# Patient Record
Sex: MALE | Race: WHITE | Employment: OTHER | URBAN - METROPOLITAN AREA
[De-identification: names, ages, dates, MRNs, and addresses within clinical notes are randomized per-mention and may not be internally consistent; named-entity substitution may affect disease eponyms.]

---

## 2023-09-06 ENCOUNTER — OFFICE VISIT (OUTPATIENT)
Dept: PRIMARY CARE | Facility: CLINIC | Age: 68
End: 2023-09-06
Payer: MEDICARE

## 2023-09-06 VITALS
TEMPERATURE: 98.1 F | DIASTOLIC BLOOD PRESSURE: 74 MMHG | BODY MASS INDEX: 33.8 KG/M2 | HEART RATE: 62 BPM | SYSTOLIC BLOOD PRESSURE: 112 MMHG | WEIGHT: 228.2 LBS | HEIGHT: 69 IN | OXYGEN SATURATION: 98 %

## 2023-09-06 DIAGNOSIS — Z00.00 HEALTH MAINTENANCE EXAMINATION: ICD-10-CM

## 2023-09-06 DIAGNOSIS — M25.561 ARTHRALGIA OF RIGHT KNEE: ICD-10-CM

## 2023-09-06 DIAGNOSIS — M25.561 ACUTE PAIN OF RIGHT KNEE: Primary | ICD-10-CM

## 2023-09-06 LAB
ALANINE AMINOTRANSFERASE (SGPT) (U/L) IN SER/PLAS: 17 U/L (ref 10–52)
ALBUMIN (G/DL) IN SER/PLAS: 4.5 G/DL (ref 3.4–5)
ALKALINE PHOSPHATASE (U/L) IN SER/PLAS: 40 U/L (ref 33–136)
ANION GAP IN SER/PLAS: 15 MMOL/L (ref 10–20)
ASPARTATE AMINOTRANSFERASE (SGOT) (U/L) IN SER/PLAS: 21 U/L (ref 9–39)
BILIRUBIN TOTAL (MG/DL) IN SER/PLAS: 1.3 MG/DL (ref 0–1.2)
CALCIUM (MG/DL) IN SER/PLAS: 9.8 MG/DL (ref 8.6–10.6)
CARBON DIOXIDE, TOTAL (MMOL/L) IN SER/PLAS: 23 MMOL/L (ref 21–32)
CHLORIDE (MMOL/L) IN SER/PLAS: 105 MMOL/L (ref 98–107)
CHOLESTEROL (MG/DL) IN SER/PLAS: 204 MG/DL (ref 0–199)
CHOLESTEROL IN HDL (MG/DL) IN SER/PLAS: 47.9 MG/DL
CHOLESTEROL/HDL RATIO: 4.3
CREATININE (MG/DL) IN SER/PLAS: 1.05 MG/DL (ref 0.5–1.3)
ERYTHROCYTE DISTRIBUTION WIDTH (RATIO) BY AUTOMATED COUNT: 13.2 % (ref 11.5–14.5)
ERYTHROCYTE MEAN CORPUSCULAR HEMOGLOBIN CONCENTRATION (G/DL) BY AUTOMATED: 33.7 G/DL (ref 32–36)
ERYTHROCYTE MEAN CORPUSCULAR VOLUME (FL) BY AUTOMATED COUNT: 92 FL (ref 80–100)
ERYTHROCYTES (10*6/UL) IN BLOOD BY AUTOMATED COUNT: 5.24 X10E12/L (ref 4.5–5.9)
GFR MALE: 76 ML/MIN/1.73M2
GLUCOSE (MG/DL) IN SER/PLAS: 104 MG/DL (ref 74–99)
HEMATOCRIT (%) IN BLOOD BY AUTOMATED COUNT: 48.4 % (ref 41–52)
HEMOGLOBIN (G/DL) IN BLOOD: 16.3 G/DL (ref 13.5–17.5)
LDL: 133 MG/DL (ref 0–99)
LEUKOCYTES (10*3/UL) IN BLOOD BY AUTOMATED COUNT: 6.4 X10E9/L (ref 4.4–11.3)
NRBC (PER 100 WBCS) BY AUTOMATED COUNT: 0 /100 WBC (ref 0–0)
PLATELETS (10*3/UL) IN BLOOD AUTOMATED COUNT: 207 X10E9/L (ref 150–450)
POTASSIUM (MMOL/L) IN SER/PLAS: 4.8 MMOL/L (ref 3.5–5.3)
PROSTATE SPECIFIC AG (NG/ML) IN SER/PLAS: 0.81 NG/ML (ref 0–4)
PROTEIN TOTAL: 7.8 G/DL (ref 6.4–8.2)
SODIUM (MMOL/L) IN SER/PLAS: 138 MMOL/L (ref 136–145)
TRIGLYCERIDE (MG/DL) IN SER/PLAS: 117 MG/DL (ref 0–149)
UREA NITROGEN (MG/DL) IN SER/PLAS: 19 MG/DL (ref 6–23)
VLDL: 23 MG/DL (ref 0–40)

## 2023-09-06 PROCEDURE — 85027 COMPLETE CBC AUTOMATED: CPT

## 2023-09-06 PROCEDURE — 80053 COMPREHEN METABOLIC PANEL: CPT

## 2023-09-06 PROCEDURE — 80061 LIPID PANEL: CPT

## 2023-09-06 PROCEDURE — 1159F MED LIST DOCD IN RCRD: CPT | Performed by: FAMILY MEDICINE

## 2023-09-06 PROCEDURE — 4004F PT TOBACCO SCREEN RCVD TLK: CPT | Performed by: FAMILY MEDICINE

## 2023-09-06 PROCEDURE — 99214 OFFICE O/P EST MOD 30 MIN: CPT | Performed by: FAMILY MEDICINE

## 2023-09-06 PROCEDURE — 84153 ASSAY OF PSA TOTAL: CPT

## 2023-09-06 PROCEDURE — 1125F AMNT PAIN NOTED PAIN PRSNT: CPT | Performed by: FAMILY MEDICINE

## 2023-09-06 RX ORDER — IBUPROFEN 800 MG/1
800 TABLET ORAL EVERY 6 HOURS PRN
COMMUNITY
Start: 2023-07-19

## 2023-09-06 ASSESSMENT — PATIENT HEALTH QUESTIONNAIRE - PHQ9
1. LITTLE INTEREST OR PLEASURE IN DOING THINGS: NOT AT ALL
2. FEELING DOWN, DEPRESSED OR HOPELESS: NOT AT ALL
SUM OF ALL RESPONSES TO PHQ9 QUESTIONS 1 AND 2: 0

## 2023-09-06 ASSESSMENT — PAIN SCALES - GENERAL: PAINLEVEL: 4

## 2023-10-30 PROBLEM — L90.5 SCAR CONDITION AND FIBROSIS OF SKIN: Status: ACTIVE | Noted: 2023-08-03

## 2023-10-30 PROBLEM — C44.612 BASAL CELL CARCINOMA OF SKIN OF RIGHT UPPER LIMB, INCLUDING SHOULDER: Status: ACTIVE | Noted: 2023-08-03

## 2023-10-30 PROBLEM — C44.729 SQUAMOUS CELL CARCINOMA OF SKIN OF LEFT LOWER LIMB, INCLUDING HIP: Status: ACTIVE | Noted: 2023-08-03

## 2023-10-30 PROBLEM — C44.519 BASAL CELL CARCINOMA OF SKIN OF OTHER PART OF TRUNK: Status: ACTIVE | Noted: 2023-08-03

## 2023-10-30 PROBLEM — C44.91 BASAL CELL CARCINOMA: Status: ACTIVE | Noted: 2023-10-30

## 2023-10-30 PROBLEM — L57.0 MULTIPLE ACTINIC KERATOSES: Status: ACTIVE | Noted: 2023-08-03

## 2023-10-30 PROBLEM — C44.712 BASAL CELL CARCINOMA OF SKIN OF RIGHT LOWER LIMB, INCLUDING HIP: Status: ACTIVE | Noted: 2023-08-03

## 2023-10-30 PROBLEM — E78.9 BORDERLINE HIGH CHOLESTEROL: Status: ACTIVE | Noted: 2023-10-30

## 2023-10-30 PROBLEM — L21.9 SEBORRHEIC DERMATITIS, UNSPECIFIED: Status: ACTIVE | Noted: 2023-08-03

## 2023-10-30 PROBLEM — L82.1 OTHER SEBORRHEIC KERATOSIS: Status: ACTIVE | Noted: 2023-08-03

## 2023-10-30 PROBLEM — D48.5 NEOPLASM OF UNCERTAIN BEHAVIOR OF SKIN: Status: ACTIVE | Noted: 2023-08-03

## 2023-10-30 PROBLEM — L98.9 NON-HEALING SKIN LESION: Status: ACTIVE | Noted: 2023-10-30

## 2023-10-30 PROBLEM — L91.8 OTHER HYPERTROPHIC DISORDERS OF THE SKIN: Status: ACTIVE | Noted: 2023-08-03

## 2023-10-30 PROBLEM — C44.310 BASAL CELL CARCINOMA OF SKIN OF UNSPECIFIED PARTS OF FACE: Status: ACTIVE | Noted: 2023-08-03

## 2023-10-30 PROBLEM — C44.90 SKIN CANCER: Status: ACTIVE | Noted: 2023-10-30

## 2023-10-30 PROBLEM — L81.4 OTHER MELANIN HYPERPIGMENTATION: Status: ACTIVE | Noted: 2023-08-03

## 2023-10-30 PROBLEM — D04.5 CARCINOMA IN SITU OF SKIN OF TRUNK: Status: ACTIVE | Noted: 2023-08-03

## 2023-10-30 PROBLEM — D18.01 HEMANGIOMA OF SKIN AND SUBCUTANEOUS TISSUE: Status: ACTIVE | Noted: 2023-08-03

## 2023-10-30 PROBLEM — C44.629 SQUAMOUS CELL CARCINOMA OF SKIN OF LEFT UPPER LIMB, INCLUDING SHOULDER: Status: ACTIVE | Noted: 2023-08-03

## 2023-10-30 PROBLEM — C44.622 SQUAMOUS CELL CARCINOMA OF SKIN OF RIGHT UPPER LIMB, INCLUDING SHOULDER: Status: ACTIVE | Noted: 2023-08-03

## 2023-10-30 PROBLEM — L57.9 SKIN CHANGES DUE TO CHRONIC EXPOSURE TO NONIONIZING RADIATION, UNSPECIFIED: Status: ACTIVE | Noted: 2023-08-03

## 2023-10-30 PROBLEM — Z85.828 PERSONAL HISTORY OF OTHER MALIGNANT NEOPLASM OF SKIN: Status: ACTIVE | Noted: 2023-08-03

## 2023-10-30 PROBLEM — C44.511 BASAL CELL CARCINOMA OF SKIN OF BREAST: Status: ACTIVE | Noted: 2023-08-03

## 2023-10-30 PROBLEM — L23.89 ALLERGIC CONTACT DERMATITIS DUE TO OTHER AGENTS: Status: ACTIVE | Noted: 2023-08-03

## 2023-10-30 PROBLEM — C44.529 SQUAMOUS CELL CARCINOMA OF SKIN OF OTHER PART OF TRUNK: Status: ACTIVE | Noted: 2023-08-03

## 2023-10-30 PROBLEM — R21 RASH AND OTHER NONSPECIFIC SKIN ERUPTION: Status: ACTIVE | Noted: 2023-08-03

## 2023-10-30 PROBLEM — L82.0 INFLAMED SEBORRHEIC KERATOSIS: Status: ACTIVE | Noted: 2023-08-03

## 2023-11-02 ENCOUNTER — OFFICE VISIT (OUTPATIENT)
Dept: DERMATOLOGY | Facility: CLINIC | Age: 68
End: 2023-11-02
Payer: MEDICARE

## 2023-11-02 DIAGNOSIS — L57.0 ACTINIC KERATOSIS: ICD-10-CM

## 2023-11-02 DIAGNOSIS — D22.5 MELANOCYTIC NEVUS OF TRUNK: ICD-10-CM

## 2023-11-02 DIAGNOSIS — L57.8 DIFFUSE PHOTODAMAGE OF SKIN: ICD-10-CM

## 2023-11-02 DIAGNOSIS — Z85.828 HISTORY OF NONMELANOMA SKIN CANCER: ICD-10-CM

## 2023-11-02 DIAGNOSIS — L82.1 SEBORRHEIC KERATOSIS: ICD-10-CM

## 2023-11-02 DIAGNOSIS — L21.9 SEBORRHEIC DERMATITIS: ICD-10-CM

## 2023-11-02 DIAGNOSIS — D48.5 NEOPLASM OF UNCERTAIN BEHAVIOR OF SKIN: Primary | ICD-10-CM

## 2023-11-02 PROCEDURE — 1159F MED LIST DOCD IN RCRD: CPT | Performed by: DERMATOLOGY

## 2023-11-02 PROCEDURE — 88305 TISSUE EXAM BY PATHOLOGIST: CPT | Mod: TC,DER | Performed by: DERMATOLOGY

## 2023-11-02 PROCEDURE — 11103 TANGNTL BX SKIN EA SEP/ADDL: CPT | Performed by: DERMATOLOGY

## 2023-11-02 PROCEDURE — 88305 TISSUE EXAM BY PATHOLOGIST: CPT | Performed by: DERMATOLOGY

## 2023-11-02 PROCEDURE — 17004 DESTROY PREMAL LESIONS 15/>: CPT | Performed by: DERMATOLOGY

## 2023-11-02 PROCEDURE — 11102 TANGNTL BX SKIN SINGLE LES: CPT | Performed by: DERMATOLOGY

## 2023-11-02 PROCEDURE — 4004F PT TOBACCO SCREEN RCVD TLK: CPT | Performed by: DERMATOLOGY

## 2023-11-02 PROCEDURE — 99214 OFFICE O/P EST MOD 30 MIN: CPT | Performed by: DERMATOLOGY

## 2023-11-02 PROCEDURE — 1125F AMNT PAIN NOTED PAIN PRSNT: CPT | Performed by: DERMATOLOGY

## 2023-11-02 ASSESSMENT — DERMATOLOGY PATIENT ASSESSMENT
DO YOU USE SUNSCREEN: OCCASIONALLY
HAVE YOU HAD OR DO YOU HAVE VASCULAR DISEASE: NO
HAVE YOU HAD OR DO YOU HAVE A STAPH INFECTION: NO
ARE YOU AN ORGAN TRANSPLANT RECIPIENT: NO
DO YOU USE A TANNING BED: NO
DO YOU HAVE ANY NEW OR CHANGING LESIONS: NO

## 2023-11-02 ASSESSMENT — DERMATOLOGY QUALITY OF LIFE (QOL) ASSESSMENT
RATE HOW BOTHERED YOU ARE BY SYMPTOMS OF YOUR SKIN PROBLEM (EG, ITCHING, STINGING BURNING, HURTING OR SKIN IRRITATION): 0 - NEVER BOTHERED
RATE HOW EMOTIONALLY BOTHERED YOU ARE BY YOUR SKIN PROBLEM (FOR EXAMPLE, WORRY, EMBARRASSMENT, FRUSTRATION): 0 - NEVER BOTHERED
ARE THERE EXCLUSIONS OR EXCEPTIONS FOR THE QUALITY OF LIFE ASSESSMENT: NO
WHAT SINGLE SKIN CONDITION LISTED BELOW IS THE PATIENT ANSWERING THE QUALITY-OF-LIFE ASSESSMENT QUESTIONS ABOUT: NONE OF THE ABOVE
RATE HOW BOTHERED YOU ARE BY EFFECTS OF YOUR SKIN PROBLEMS ON YOUR ACTIVITIES (EG, GOING OUT, ACCOMPLISHING WHAT YOU WANT, WORK ACTIVITIES OR YOUR RELATIONSHIPS WITH OTHERS): 0 - NEVER BOTHERED

## 2023-11-02 ASSESSMENT — PATIENT GLOBAL ASSESSMENT (PGA): PATIENT GLOBAL ASSESSMENT: PATIENT GLOBAL ASSESSMENT:  1 - CLEAR

## 2023-11-02 NOTE — PROGRESS NOTES
Subjective     Yunior Alvarez is a 68 y.o. male who presents for the following: Skin Check.     Review of Systems:  No other skin or systemic complaints other than what is documented elsewhere in the note.    The following portions of the chart were reviewed this encounter and updated as appropriate:       Skin Cancer History  No skin cancer on file.    Specialty Problems          Dermatology Problems    Basal cell carcinoma of skin of other part of trunk    Basal cell carcinoma of skin of right lower limb, including hip    Basal cell carcinoma of skin of right upper limb, including shoulder    Basal cell carcinoma of skin of unspecified parts of face    Carcinoma in situ of skin of trunk    Hemangioma of skin and subcutaneous tissue    Inflamed seborrheic keratosis    Multiple actinic keratoses    Neoplasm of uncertain behavior of skin    Other hypertrophic disorders of the skin    Other melanin hyperpigmentation    Other seborrheic keratosis    Personal history of other malignant neoplasm of skin    Rash and other nonspecific skin eruption    Scar condition and fibrosis of skin    Seborrheic dermatitis, unspecified    Skin changes due to chronic exposure to nonionizing radiation, unspecified    Squamous cell carcinoma of skin of left upper limb, including shoulder    Squamous cell carcinoma of skin of left lower limb, including hip    Squamous cell carcinoma of skin of other part of trunk    Squamous cell carcinoma of skin of right upper limb, including shoulder    Basal cell carcinoma    Non-healing skin lesion    Skin cancer       Past Dermatologic / Past Relevant Medical History:      - history of several nonmelanoma skin cancers, including:    - most recently SCC in situ on left lateral antecubital fossa and 2 BCCs on left medial lower back and right lateral mid lower back all 3 diagnosed on 4/6/23 and s/p Mohs surgery by Dr. Rivera on 5/8/23 and then 2 wide local excisions each with 5-mm margins by Dr. Rivera  both on 5/15/23, respectively  - BCC on mid upper abdomen diagnosed on 11/3/22 s/p wide local excision with 5- mm margins by Dr. Rivera on 1/27/23  - SCC on right posterior proximal arm, BCC on right upper chest, and SCC in situ on right lateral mid back lateral all 3 diagnosed on 5/5/22 and s/p Mohs surgery by Dr. Betancourt on 8/16/22, wide local excision with 5-mm margins by Dr. Betancourt on 8/23/22, and ED&C by Dr. Corea on 11/15/22, respectively  - BCC on left lateral upper forehead diagnosed on 6/28/21 s/p Mohs surgery by Dr. Betancourt on 9/2/21  - BCC on right upper back diagnosed on 1/8/21 s/p ED&C on 3/8/21  - previously BCCs on right medial proximal leg, right anterior proximal leg, right anterior mid leg, and right medial lower back all 4 diagnosed on 9/11/20 s/p ED&C on 11/12/20  - history of SCC in situ on left anterior distal leg diagnosed on 5/15/20 s/p ED&C on 6/12/20 - history of BCC on left medial mid-abdomen diagnosed on 2/14/20 s/p ED&C on 5/15/20 - SCC in situ on left upper chest and BCCs on right anterior shoulder and right shoulder proximal all 3 diagnosed on 7/18/19 and s/p ED&C at last visit on 8/22/20  - history of SCC on left anterior shoulder diagnosed at initial visit on 10/5/18 s/p wide local excision with 5-mm margins by Dr. Choi on 10/22/18  - history of 2 BCCs on right dorsal hand and right shoulder diagnosed at initial visit on 10/5/18 and s/p Mohs surgery by Dr. Betancourt on 12/13/18 and ED&C on 11/1/18, respectively    - AKs s/p course of topical field therapy with Efudex 5% cream completed on his face and scalp in January 2019 and then on his chest in November 2019 and then on his left dorsal and forearm and arm in March 2020 and most recently his forehead and scalp in January 2023    - no history of melanoma    Family History:    Mother - nonmelanoma skin cancers of unknown type  No family history of melanoma    Social History:      Allergies:  Patient has no known  allergies.    Current Medications / CAM's:    Current Outpatient Medications:      mg tablet, Take 1 tablet (800 mg) by mouth every 6 hours if needed., Disp: , Rfl:      Objective   Well appearing patient in no apparent distress; mood and affect are within normal limits.    A full examination was performed including scalp, face, eyes, ears, nose, lips, neck, chest, axillae, abdomen, back, bilateral upper extremities, and bilateral lower extremities. All findings within normal limits unless otherwise noted below.    Assessment/Plan   1. Neoplasm of uncertain behavior of skin (3)  Mid-Upper Chest  2 cm erythematous, scaly plaque           Lesion biopsy  Type of biopsy: tangential    Informed consent: discussed and consent obtained    Timeout: patient name, date of birth, surgical site, and procedure verified    Procedure prep:  Patient was prepped and draped  Anesthesia: the lesion was anesthetized in a standard fashion    Anesthetic:  1% lidocaine w/ epinephrine 1-100,000 local infiltration  Instrument used: DermaBlade    Hemostasis achieved with: aluminum chloride    Outcome: patient tolerated procedure well    Post-procedure details: sterile dressing applied and wound care instructions given    Dressing type: petrolatum and bandage      Staff Communication: Dermatology Local Anesthesia: 1 % Lidocaine / Epinephrine - Amount:0.5ml    Specimen 1 - Dermatopathology- DERM LAB  Differential Diagnosis: SCCIS v AK  Check Margins Yes/No?:    Comments:    Dermpath Lab: Routine Histopathology (formalin-fixed tissue)    Right Anterior Inferior Neck  1.7 cm erythematous, scaly papule           Lesion biopsy  Type of biopsy: tangential    Informed consent: discussed and consent obtained    Timeout: patient name, date of birth, surgical site, and procedure verified    Procedure prep:  Patient was prepped and draped  Anesthesia: the lesion was anesthetized in a standard fashion    Anesthetic:  1% lidocaine w/ epinephrine  1-100,000 local infiltration  Instrument used: DermaBlade    Hemostasis achieved with: aluminum chloride    Outcome: patient tolerated procedure well    Post-procedure details: sterile dressing applied and wound care instructions given    Dressing type: petrolatum and bandage      Staff Communication: Dermatology Local Anesthesia: 1 % Lidocaine / Epinephrine - Amount:0.5ml    Specimen 2 - Dermatopathology- DERM LAB  Differential Diagnosis: AK v BCC  Check Margins Yes/No?:    Comments:    Dermpath Lab: Routine Histopathology (formalin-fixed tissue)    Right Anterior Proximal Arm  1.4 cm erythematous, scaly papule           Lesion biopsy  Type of biopsy: tangential    Informed consent: discussed and consent obtained    Timeout: patient name, date of birth, surgical site, and procedure verified    Procedure prep:  Patient was prepped and draped  Anesthesia: the lesion was anesthetized in a standard fashion    Anesthetic:  1% lidocaine w/ epinephrine 1-100,000 local infiltration  Instrument used: DermaBlade    Hemostasis achieved with: aluminum chloride    Outcome: patient tolerated procedure well    Post-procedure details: sterile dressing applied and wound care instructions given    Dressing type: petrolatum and bandage      Staff Communication: Dermatology Local Anesthesia: 1 % Lidocaine / Epinephrine - Amount:0.5ml    Specimen 3 - Dermatopathology- DERM LAB  Differential Diagnosis: BCC v AK  Check Margins Yes/No?:    Comments:    Dermpath Lab: Routine Histopathology (formalin-fixed tissue)    2. Actinic keratosis (24)  Scalp (24)  Scattered on the patient's scalp, face, and bilateral shoulders, there are multiple erythematous, gritty, scaly macules     Actinic Keratoses -scattered on scalp, face, and bilateral shoulders.  The pre-cancerous nature of these lesions and treatment options, including liquid nitrogen cryotherapy and field therapy, such as with a course of topical therapy with Efudex 5% cream, were  discussed extensively with the patient today.  At this time, I recommend treatment with liquid nitrogen cryotherapy in the office today.  In addition, following cryotherapy, I recommend topical field therapy with a course of Efudex 5% cream, which the patient was instructed to apply twice daily to affected areas of his bilateral dorsal hands and forearms and shoulders for 2-3 weeks.  The risks, benefits, and side effects of this medication, including the redness and irritation expected with its use, were discussed; the patient was instructed to discontinue use of the medication earlier if necessary due to excessive irritation.  I also prescribed topical steroid therapy with Triamcinolone 0.025% cream, which the patient may apply twice daily to affected areas for up to 7-10 days as needed for inflammation following the course of Efudex, which the patient plans to complete in January 2024.  The risks, benefits, and side effects of these medications, including the redness, irritation, and discomfort associated with Efudex use as well as possible skin atrophy with overuse of topical steroids, were discussed at length.  I will have the patient return to my office in 4 to 6 months, pending the above biopsy results, for follow-up.  The patient expressed understanding, is in agreement with this plan, and wishes to proceed with cryotherapy today.    Destr of lesion - Scalp  Complexity: simple    Destruction method: cryotherapy    Informed consent: discussed and consent obtained    Lesion destroyed using liquid nitrogen: Yes    Cryotherapy cycles:  1  Outcome: patient tolerated procedure well with no complications    Post-procedure details: wound care instructions given      3. Melanocytic nevus of trunk  Scattered on the patient's face, neck, trunk, and extremities, there are several small, round- to oval-shaped, brown-pigmented and pink-colored, symmetric, uniform-appearing macules and dome-shaped papules    Clinically  benign- to slightly atypical-appearing nevi - the clinically benign- to slightly atypical-appearing nature of the patient's nevi was discussed with the patient today.  None of the patient's nevi meet threshold for biopsy today.  I emphasized the importance of performing monthly self-skin exams using the ABCDs of monitoring moles, which were reviewed with the patient today and an informational hand-out provided.  I also emphasized the importance of sun avoidance and sun protection with daily sunscreen use.  The patient expressed understanding and is in agreement with this plan.    4. Seborrheic keratosis  Scattered on the patient's face, neck, trunk, and extremities, there are multiple tan- to light brown-colored, hyperkeratotic, stuck-on appearing papules of varying size and shape    Seborrheic Keratoses - the benign nature of these lesions was discussed with the patient today and reassurance provided.  No treatment is medically indicated for these lesions at this time.    5. Seborrheic dermatitis  Head - Anterior (Face)  On the patient's scalp and face, mainly his beard area, there are pink, scaly patches with whitish-yellowish, greasy scale    Seborrheic Dermatitis -flare on scalp and face, mainly beard area.  The potentially chronic and intermittently flaring nature of this condition and treatment options were discussed extensively with the patient today.  At this time, I recommend topical anti-fungal therapy with Ketoconazole 2% shampoo, which the patient was instructed to use 2-3 days per week, alternating with over-the-counter anti-dandruff shampoos, such as Head & Shoulders, Selsun Blue, and Neutrogena T-gel, every month.  The risks, benefits, and side effects of this medication were discussed.  The patient expressed understanding and is in agreement with this plan.    6. History of nonmelanoma skin cancer  On the patient's left lateral antecubital fossa, left medial lower back, right lateral mid lower back,  and scattered on face, neck, trunk, and extremities, there are well-healed scars with no evidence of recurrent growth on exam today.    History of nonmelanoma skin cancers and actinic keratoses and photodamage.  There is no evidence of recurrence at the sites of the patient's previously treated NMSCs on exam today.  The signs and symptoms of skin cancer were reviewed and the patient was advised to practice sun protection and sun avoidance, use daily sunscreen, and perform regular self skin exams.  I will have the patient return to our office in 4 to 6 months, pending the above biopsy results, for routine follow-up and skin exam, and the patient was instructed to call our office should the patient notice any new, changing, symptomatic, or otherwise concerning skin lesions before then.  The patient expressed understanding and is in agreement with this plan.    7. Diffuse photodamage of skin  Photodistributed  Diffuse photodamage with actinic changes with telangiectasia and mottled pigmentation in sun-exposed areas.    Photodamage.  The signs and symptoms of skin cancer were reviewed and the patient was advised to practice sun protection and sun avoidance, use daily sunscreen, and perform regular self skin exams.  Sun protection was discussed, including avoiding the mid-day sun, wearing a sunscreen with SPF at least 50, and stressing the need for reapplication of sunscreen and applying more than they think they need.

## 2023-11-06 LAB
LABORATORY COMMENT REPORT: NORMAL
PATH REPORT.FINAL DX SPEC: NORMAL
PATH REPORT.GROSS SPEC: NORMAL
PATH REPORT.MICROSCOPIC SPEC OTHER STN: NORMAL
PATH REPORT.RELEVANT HX SPEC: NORMAL
PATH REPORT.TOTAL CANCER: NORMAL

## 2023-12-06 DIAGNOSIS — L30.9 DERMATITIS: ICD-10-CM

## 2023-12-06 RX ORDER — TRIAMCINOLONE ACETONIDE 0.25 MG/G
CREAM TOPICAL
Qty: 80 G | Refills: 0 | Status: SHIPPED | OUTPATIENT
Start: 2023-12-06

## 2024-03-22 ENCOUNTER — APPOINTMENT (OUTPATIENT)
Dept: DERMATOLOGY | Facility: CLINIC | Age: 69
End: 2024-03-22
Payer: MEDICARE

## 2024-05-02 ENCOUNTER — OFFICE VISIT (OUTPATIENT)
Dept: DERMATOLOGY | Facility: CLINIC | Age: 69
End: 2024-05-02
Payer: MEDICARE

## 2024-05-02 DIAGNOSIS — C44.612 BASAL CELL CARCINOMA (BCC) OF SKIN OF RIGHT UPPER EXTREMITY INCLUDING SHOULDER: ICD-10-CM

## 2024-05-02 DIAGNOSIS — Z85.828 HISTORY OF NONMELANOMA SKIN CANCER: ICD-10-CM

## 2024-05-02 DIAGNOSIS — L57.8 DIFFUSE PHOTODAMAGE OF SKIN: ICD-10-CM

## 2024-05-02 DIAGNOSIS — D22.5 MELANOCYTIC NEVUS OF TRUNK: ICD-10-CM

## 2024-05-02 DIAGNOSIS — L21.9 SEBORRHEIC DERMATITIS: ICD-10-CM

## 2024-05-02 DIAGNOSIS — L82.1 SEBORRHEIC KERATOSIS: ICD-10-CM

## 2024-05-02 DIAGNOSIS — L72.0 EPIDERMOID CYST: ICD-10-CM

## 2024-05-02 DIAGNOSIS — L57.0 ACTINIC KERATOSIS: ICD-10-CM

## 2024-05-02 DIAGNOSIS — D48.5 NEOPLASM OF UNCERTAIN BEHAVIOR OF SKIN: Primary | ICD-10-CM

## 2024-05-02 PROCEDURE — 11302 SHAVE SKIN LESION 1.1-2.0 CM: CPT | Performed by: DERMATOLOGY

## 2024-05-02 PROCEDURE — 11102 TANGNTL BX SKIN SINGLE LES: CPT | Performed by: DERMATOLOGY

## 2024-05-02 PROCEDURE — 11103 TANGNTL BX SKIN EA SEP/ADDL: CPT | Performed by: DERMATOLOGY

## 2024-05-02 PROCEDURE — 17004 DESTROY PREMAL LESIONS 15/>: CPT | Performed by: DERMATOLOGY

## 2024-05-02 PROCEDURE — 99214 OFFICE O/P EST MOD 30 MIN: CPT | Performed by: DERMATOLOGY

## 2024-05-02 PROCEDURE — 88305 TISSUE EXAM BY PATHOLOGIST: CPT | Performed by: DERMATOLOGY

## 2024-05-02 PROCEDURE — 1159F MED LIST DOCD IN RCRD: CPT | Performed by: DERMATOLOGY

## 2024-05-02 ASSESSMENT — DERMATOLOGY QUALITY OF LIFE (QOL) ASSESSMENT: ARE THERE EXCLUSIONS OR EXCEPTIONS FOR THE QUALITY OF LIFE ASSESSMENT: NO

## 2024-05-02 ASSESSMENT — DERMATOLOGY PATIENT ASSESSMENT
DO YOU USE SUNSCREEN: OCCASIONALLY
DO YOU HAVE ANY NEW OR CHANGING LESIONS: NO
DO YOU USE A TANNING BED: NO

## 2024-05-04 RX ORDER — KETOCONAZOLE 20 MG/ML
SHAMPOO, SUSPENSION TOPICAL 3 TIMES WEEKLY
Qty: 120 ML | Refills: 11 | Status: SHIPPED | OUTPATIENT
Start: 2024-05-06

## 2024-05-05 NOTE — PROGRESS NOTES
Subjective     Yunior Alvarez is a 68 y.o. male who presents for the following: Skin Exam.  He notes a bump in the middle of his back, which has been present for several years.  It has become more raised from his skin over that time and itches frequently, but it has not changed in any other way, including in size or color, and it does not hurt or bleed.  He also notes dry, scaly patches in the center of his chest.  He denies any other new, changing, or concerning skin lesions since his last visit; no bleeding, itching, or burning lesions.      Review of Systems:  No other skin or systemic complaints other than what is documented elsewhere in the note.    The following portions of the chart were reviewed this encounter and updated as appropriate:       Skin Cancer History  Biopsy Date Type Location Status   11/2/23 BCC Right Anterior Proximal Arm Refer Mohs/Surgeon     Specialty Problems          Dermatology Problems    Basal cell carcinoma of skin of other part of trunk    Basal cell carcinoma of skin of right lower limb, including hip    Basal cell carcinoma of skin of right upper limb, including shoulder    Basal cell carcinoma of skin of unspecified parts of face    Carcinoma in situ of skin of trunk    Hemangioma of skin and subcutaneous tissue    Inflamed seborrheic keratosis    Multiple actinic keratoses    Neoplasm of uncertain behavior of skin    Other hypertrophic disorders of the skin    Other melanin hyperpigmentation    Other seborrheic keratosis    Personal history of other malignant neoplasm of skin    Rash and other nonspecific skin eruption    Scar condition and fibrosis of skin    Seborrheic dermatitis, unspecified    Skin changes due to chronic exposure to nonionizing radiation, unspecified    Squamous cell carcinoma of skin of left upper limb, including shoulder    Squamous cell carcinoma of skin of left lower limb, including hip    Squamous cell carcinoma of skin of other part of trunk     Squamous cell carcinoma of skin of right upper limb, including shoulder    Basal cell carcinoma    Non-healing skin lesion    Skin cancer       Past Dermatologic / Past Relevant Medical History:    - history of several nonmelanoma skin cancers, including:    - most recently biopsy-proven BCC on right anterior proximal arm diagnosed at last visit on 11/2/23 and pending definitive treatment  - SCC in situ on left lateral antecubital fossa and 2 BCCs on left medial lower back and right lateral mid lower back all 3 diagnosed on 4/6/23 and s/p Mohs surgery by Dr. Rivera on 5/8/23 and then 2 wide local excisions each with 5-mm margins by Dr. Rivera both on 5/15/23, respectively  - BCC on mid upper abdomen diagnosed on 11/3/22 s/p wide local excision with 5-mm margins by Dr. Rivera on 1/27/23  - SCC on right posterior proximal arm, BCC on right upper chest, and SCC in situ on right lateral mid back lateral all 3 diagnosed on 5/5/22 and s/p Mohs surgery by Dr. Betancourt on 8/16/22, wide local excision with 5-mm margins by Dr. Betancourt on 8/23/22, and ED&C by Dr. Corea on 11/15/22, respectively  - BCC on left lateral upper forehead diagnosed on 6/28/21 s/p Mohs surgery by Dr. Betancourt on 9/2/21  - BCC on right upper back diagnosed on 1/8/21 s/p ED&C on 3/8/21  - previously BCCs on right medial proximal leg, right anterior proximal leg, right anterior mid leg, and right medial lower back all 4 diagnosed on 9/11/20 s/p ED&C on 11/12/20  - history of SCC in situ on left anterior distal leg diagnosed on 5/15/20 s/p ED&C on 6/12/20 - history of BCC on left medial mid-abdomen diagnosed on 2/14/20 s/p ED&C on 5/15/20 - SCC in situ on left upper chest and BCCs on right anterior shoulder and right shoulder proximal all 3 diagnosed on 7/18/19 and s/p ED&C at last visit on 8/22/20  - history of SCC on left anterior shoulder diagnosed at initial visit on 10/5/18 s/p wide local excision with 5-mm margins by Dr. Choi on 10/22/18  - history of  2 BCCs on right dorsal hand and right shoulder diagnosed at initial visit on 10/5/18 and s/p Mohs surgery by Dr. Betancourt on 12/13/18 and ED&C on 11/1/18, respectively    - AKs s/p course of topical field therapy with Efudex 5% cream completed on:  - face and scalp in January 2019  - chest in November 2019  - left dorsal hand forearm and arm in March 2020  - forehead and scalp in January 2023  - upper chest and right antecubital fossa in March 2024    - no history of melanoma    Family History:    Mother - nonmelanoma skin cancers of unknown type  No family history of melanoma    Social History:    The patient is retired from working for [employer] and lives in [place] with his wife, and they have 4 children, all of whom live in [place], including 2 in [place]; he enjoys motorcycling and used to use tanning booths regularly; his 2 grandsons play hockey, including one for his high school in [place]; he recently went on a motorcycle rally in [place]    Allergies:  Patient has no known allergies.    Current Medications / CAM's:    Current Outpatient Medications:      mg tablet, Take 1 tablet (800 mg) by mouth every 6 hours if needed., Disp: , Rfl:     [START ON 5/6/2024] ketoconazole (NIZOral) 2 % shampoo, Apply topically 3 times a week., Disp: 120 mL, Rfl: 11    triamcinolone (Kenalog) 0.025 % cream, Apply twice daily to affected areas (avoid the eyes) for 1-2 weeks following course of Efudex as directed, Disp: 80 g, Rfl: 0     Objective   Well appearing patient in no apparent distress; mood and affect are within normal limits.    A full examination was performed including scalp, face, eyes, ears, nose, lips, neck, chest, axillae, abdomen, back, bilateral upper extremities, and bilateral lower extremities. All findings within normal limits unless otherwise noted below.    Assessment/Plan   1. Neoplasm of uncertain behavior of skin (3)  Left Lateral Mid-Abdomen  1 cm pink, shiny papule           Lesion biopsy  Type of  biopsy: tangential    Informed consent: discussed and consent obtained    Timeout: patient name, date of birth, surgical site, and procedure verified    Procedure prep:  Patient was prepped and draped  Anesthesia: the lesion was anesthetized in a standard fashion    Anesthetic:  1% lidocaine w/ epinephrine 1-100,000 local infiltration  Instrument used: flexible razor blade    Hemostasis achieved with: aluminum chloride    Outcome: patient tolerated procedure well    Post-procedure details: wound care instructions given      Staff Communication: Dermatology Local Anesthesia: 1 % Lidocaine / Epinephrine - Amount:0.5ml    Specimen 1 - Dermatopathology- DERM LAB  Differential Diagnosis: BCC v SCCIS  Check Margins Yes/No?:    Comments:    Dermpath Lab: Routine Histopathology (formalin-fixed tissue)    Right Medial Mid-Dorsal Forearm  1 cm pink, shiny papule           Lesion biopsy  Type of biopsy: tangential    Informed consent: discussed and consent obtained    Timeout: patient name, date of birth, surgical site, and procedure verified    Procedure prep:  Patient was prepped and draped  Anesthesia: the lesion was anesthetized in a standard fashion    Anesthetic:  1% lidocaine w/ epinephrine 1-100,000 local infiltration  Instrument used: flexible razor blade    Hemostasis achieved with: aluminum chloride    Outcome: patient tolerated procedure well    Post-procedure details: wound care instructions given      Staff Communication: Dermatology Local Anesthesia: 1 % Lidocaine / Epinephrine - Amount:0.5ml    Specimen 2 - Dermatopathology- DERM LAB  Differential Diagnosis: AK v BCC  Check Margins Yes/No?:    Comments:    Dermpath Lab: Routine Histopathology (formalin-fixed tissue)    Left Upper Chest  8 mm dark brown pigmented, asymmetric macule with an asymmetric pigment network and irregular borders           Shave removal    Lesion length (cm):  0.8  Margin per side (cm):  0.2  Lesion diameter (cm):  1.2  Informed consent:  discussed and consent obtained    Timeout: patient name, date of birth, surgical site, and procedure verified    Procedure prep:  Patient was prepped and draped  Anesthesia: the lesion was anesthetized in a standard fashion    Anesthetic:  1% lidocaine w/ epinephrine 1-100,000 local infiltration  Instrument used: flexible razor blade    Hemostasis achieved with: aluminum chloride    Outcome: patient tolerated procedure well    Post-procedure details: sterile dressing applied and wound care instructions given    Dressing type: bandage and petrolatum      Staff Communication: Dermatology Local Anesthesia: 1 % Lidocaine / Epinephrine - Amount:0.5ml    Specimen 3 - Dermatopathology- DERM LAB  Differential Diagnosis: SK v AMH v pigmented SCCIS  Check Margins Yes/No?:    Comments:    Dermpath Lab: Routine Histopathology (formalin-fixed tissue)    2. Epidermoid cyst  Mid Back  On the patient's mid back, there is a 1 cm round, flesh-colored, mobile, non-tender, subcutaneous, cystic-appearing nodule with a central punctum    Epidermoid Cyst -mid back.  The benign nature of this cystic lesion was discussed with the patient today and reassurance provided.  No treatment is medically indicated for this lesion at this time.  However, given the history the patient provides of frequent associated symptoms, the patient wishes to have the lesion removed if possible.  Thus, I discussed the possibility of undergoing surgical excision of the cyst for definitive removal.  After discussing the risks and benefits of the procedure, the patient expressed understanding and wishes to have the cyst excised.  Thus, the patient was provided a referral today to our Dermatologic surgery unit for further evaluation and consideration of cyst excision.  The patient expressed understanding, is in agreement with this plan, and will anticipate a phone call from our surgery unit within the next 1-2 weeks to schedule the surgery.    Referral to Dermatology  - Mohs Surgery - Mid Back    3. Actinic keratosis (25)  Chest - Medial (Center) (25)  On the patient's mid upper chest and right anterior inferior neck, there are pink, well-healed scars at his recent biopsy sites each with a surrounding rim of erythema, grittiness, and scale; scattered on the patient's face, neck, and bilateral ears and dorsal hands, there are multiple erythematous, gritty, scaly macules    Biopsy-proven Actinic Keratoses and additional AKs -mid upper chest and right anterior inferior neck, both present on the deep and peripheral margins, and scattered on face, neck, and bilateral ears and dorsal hands, respectively.  The pre-cancerous nature of these lesions and treatment options were discussed with the patient today.  At this time, I recommend treatment with liquid nitrogen cryotherapy.  The patient expressed understanding, is in agreement with this plan, and wishes to proceed with cryotherapy today.    Destr of lesion - Chest - Medial (Center)  Complexity: simple    Destruction method: cryotherapy    Informed consent: discussed and consent obtained    Lesion destroyed using liquid nitrogen: Yes    Cryotherapy cycles:  1  Outcome: patient tolerated procedure well with no complications    Post-procedure details: wound care instructions given      4. Basal cell carcinoma (BCC) of skin of right upper extremity including shoulder  Right anterior proximal arm  Pink, well-healed scar at his recent biopsy site    Biopsy-proven basal cell carcinoma - right anterior proximal arm; superficial growth pattern; diagnosed at last visit on 11/2/23 and pending definitive treatment.  The malignant nature of BCC, the need for further definitive treatment, and treatment options, including Mohs surgery, wide local excision, and Electrodesiccation & Curettage, were discussed extensively with the patient today.  After discussing the risks and benefits of each option, including the differences in cure rates and  permanent scar results, the patient expressed understanding and wished to be referred to our Dermatologic surgery unit for definitive treatment of this BCC, for which a referral was already submitted on his behalf.  He expressed understanding, is in agreement with this plan, and states he already scheduled his surgery to be performed in our office by Dr. Rivera on 8/2/24.    5. Melanocytic nevus of trunk  Scattered on the patient's face, neck, trunk, and extremities, there are several small, round- to oval-shaped, brown-pigmented and pink-colored, symmetric, uniform-appearing macules and dome-shaped papules    Clinically benign- to slightly atypical-appearing nevi - the clinically benign- to slightly atypical-appearing nature of the patient's nevi was discussed with the patient today.  None of the patient's nevi meet threshold for biopsy today.  I emphasized the importance of performing monthly self-skin exams using the ABCDs of monitoring moles, which were reviewed with the patient today and an informational hand-out provided.  I also emphasized the importance of sun avoidance and sun protection with daily sunscreen use.  The patient expressed understanding and is in agreement with this plan.    6. Seborrheic keratosis  Scattered on the patient's face, neck, trunk, and extremities, there are multiple tan- to light brown-colored, hyperkeratotic, stuck-on appearing papules of varying size and shape    Seborrheic Keratoses - the benign nature of these lesions was discussed with the patient today and reassurance provided.  No treatment is medically indicated for these lesions at this time.    7. Seborrheic dermatitis  Right Breast  On the patient's chest, there are pink, scaly patches with whitish-yellowish, greasy scale    Seborrheic Dermatitis - flare on chest.  The potentially chronic and intermittently flaring nature of this condition and treatment options were discussed extensively with the patient today.  At this  time, I recommend topical anti-fungal therapy with Ketoconazole 2% shampoo, which the patient was instructed to use 2-3 days per week, alternating with over-the-counter anti-dandruff shampoos, such as Head & Shoulders, Selsun Blue, and Neutrogena T-gel, every month.  The risks, benefits, and side effects of this medication were discussed.  The patient expressed understanding and is in agreement with this plan.    ketoconazole (NIZOral) 2 % shampoo - Right Breast  Apply topically 3 times a week.    8. History of nonmelanoma skin cancer  On the patient's left lateral antecubital fossa, left medial lower back, right lateral mid lower back, mid upper abdomen, and scattered on his face, neck, trunk, and extremities, there are well-healed scars with no evidence of recurrent growth on exam today.    History of nonmelanoma skin cancers and actinic keratoses and photodamage.  There is no evidence of recurrence at the sites of the patient's previously treated NMSCs on exam today.  The signs and symptoms of skin cancer were reviewed and the patient was advised to practice sun protection and sun avoidance, use daily sunscreen, and perform regular self skin exams.  I will have the patient return to our office in 3 to 4 months, pending the above biopsy results, for routine follow-up and skin exam, and the patient was instructed to call our office should the patient notice any new, changing, symptomatic, or otherwise concerning skin lesions before then.  The patient expressed understanding and is in agreement with this plan.    9. Diffuse photodamage of skin  Photodistributed  Diffuse photodamage with actinic changes with telangiectasia and mottled pigmentation in sun-exposed areas.    Photodamage.  The signs and symptoms of skin cancer were reviewed and the patient was advised to practice sun protection and sun avoidance, use daily sunscreen, and perform regular self skin exams.  Sun protection was discussed, including avoiding  the mid-day sun, wearing a sunscreen with SPF at least 50, and stressing the need for reapplication of sunscreen and applying more than they think they need.

## 2024-08-02 ENCOUNTER — APPOINTMENT (OUTPATIENT)
Dept: DERMATOLOGY | Facility: CLINIC | Age: 69
End: 2024-08-02
Payer: MEDICARE

## 2024-08-02 DIAGNOSIS — C44.612 BASAL CELL CARCINOMA (BCC) OF SKIN OF RIGHT UPPER EXTREMITY INCLUDING SHOULDER: ICD-10-CM

## 2024-08-02 PROCEDURE — 11602 EXC TR-EXT MAL+MARG 1.1-2 CM: CPT | Performed by: DERMATOLOGY

## 2024-08-02 PROCEDURE — 12032 INTMD RPR S/A/T/EXT 2.6-7.5: CPT | Performed by: DERMATOLOGY

## 2024-08-02 NOTE — PROGRESS NOTES
Excision Operative Note    Date of Surgery:  8/2/2024  Surgeon:  Madeleine Rivera MD  Office Location: [address]   [facility] [address]  [facility] [address]  Dept: [phone]  Dept Fax: [phone]  Referring Provider: Jhony Alexandre MD  [address]   [facility] [address]  [place] [id number]    Keisha Alvarez is a 69 y.o. male who presents for the following: Excision for basal cell carcinoma.    According to the patient, the lesion has been present for approximately greater than 1 year at the time of diagnosis.  The lesion is not causing symptoms.  The lesion has not been treated previously.    The patient does not have a pacemaker / defibrillator.  The patient does not have a heart valve / joint replacement.    The patient is not on blood thinners.   The patient does not have a history of hepatitis B or C.  The patient does not have a history of HIV.  The patient does not have a history of immunosuppression (e.g. organ transplantation, malignancy, medications)    Is it okay to leave a phone message with results? Y  Who else may we leave results with: (name, relationship)     The following portions of the chart were reviewed this encounter and updated as appropriate:         Assessment/Plan   Pre-procedure:   Obtained informed consent: written from patient  The surgical site was identified and confirmed with the patient.     Intra-operative:   Audible time out called at : 1:10 PM 08/02/24  by: Peggy Riddle LPN   Verified patient name, birthdate, site, specimen bottle label & requisition.    The planned procedure(s) was again reviewed with the patient. The risks of bleeding, infection, nerve damage and scarring were reviewed. The patient identity, surgical site, and planned procedure(s) were verified.     Biopsy Accession Number: [id number]  Basal cell carcinoma (BCC) of skin of right upper extremity including shoulder  Right Anterior Proximal  Arm    Skin excision    Lesion length (cm):  0.4  Lesion width (cm):  0.4  Margin per side (cm):  0.5  Total excision diameter (cm):  1.4  Informed consent: discussed and consent obtained    Timeout: patient name, date of birth, surgical site, and procedure verified    Procedure prep:  Patient prepped in sterile fashion  Anesthesia: the lesion was anesthetized in a standard fashion    Anesthetic:  1% lidocaine w/ epinephrine 1-100,000 local infiltration  Instrument used: #15 blade    Hemostasis achieved with: electrodesiccation    Outcome: patient tolerated procedure well with no complications    Post-procedure details: sterile dressing applied and wound care instructions given    Dressing type: pressure dressing    Additional details:  The nature of the diagnosis was explained. The lesion is a skin cancer.  It is locally aggressive but has a very low to non-existent risk of spreading.  The condition is associated with sun exposure.  Warning signs of non-melanoma skin cancer discussed. Patient was instructed to perform monthly self skin examination.  We recommended that the patient have regular full skin exams given an increased risk of subsequent skin cancers. The patient was instructed to use sun protective behaviors including use of broad spectrum sunscreens and sun protective clothing to reduce risk of skin cancers.  Excision was discussed with the patient. The risks, benefits and potential adverse effects were reviewed. Discussion included but was not limited to the cure rate, relative cost, wound care requirements, activity restrictions, likely scar outcome and time to heal were reviewed. It was explained that the scar would be longer than the original lesion.  The patient elected to proceed with exicision today.    Skin repair  Complexity:  Intermediate  Final length (cm):  3  Informed consent: discussed and consent obtained    Timeout: patient name, date of birth, surgical site, and procedure verified     Procedure prep:  Patient prepped in sterile fashion  Anesthesia: the lesion was anesthetized in a standard fashion    Anesthetic:  1% lidocaine w/ epinephrine 1-100,000 local infiltration  Reason for type of repair: reduce tension to allow closure    Undermining: edges undermined    Subcutaneous layers (deep stitches):   Suture size:  3-0  Suture type: Vicryl (polyglactin 910)    Stitches:  Buried vertical mattress  Fine/surface layer approximation (top stitches):   Suture size:  5-0  Suture type: fast-absorbing plain gut    Stitches: simple running    Hemostasis achieved with: electrodesiccation  Outcome: patient tolerated procedure well with no complications    Post-procedure details: sterile dressing applied and wound care instructions given    Dressing type: pressure dressing      Specimen 1 - Dermatopathology- DERM LAB  Differential Diagnosis: Basal Cell Carcinoma  Check Margins Yes   Comments:  Right Anterior Proximal Arm  Dermpath Lab: Routine Histopathology (formalin-fixed tissue)    Intermediate Linear Repair:  Given the location and size of the defect, it was determined that an intermediate layered linear closure was required to restore normal anatomy and function. The repair is an intermediate closure as two layers of sutures were required. The defect was undermined extensively at the level of the subcutaneous plane. Standing cutaneous cones were removed using Burow's triangles. The wound edges were brought into close approximation with buried vertical mattress sutures. The remainder of the wound was then closed with epidermal top sutures.    The final repair measured 3 cm        Wound care was discussed, and the patient was given written post-operative wound care instructions.      The patient will follow up with Madeleine Rivera MD as needed for any post operative problems or concerns, and will follow up with their primary dermatologist as scheduled.

## 2024-08-07 NOTE — RESULT ENCOUNTER NOTE
Isidro Alvarez - your results show the skin cancer has been completely removed. No further treatment required. Continue skin checks every 6-12 months with your primary dermatologist. Please call/message us with any questions/concerns.

## 2024-08-08 ENCOUNTER — OFFICE VISIT (OUTPATIENT)
Dept: PRIMARY CARE | Facility: CLINIC | Age: 69
End: 2024-08-08
Payer: MEDICARE

## 2024-08-08 VITALS
SYSTOLIC BLOOD PRESSURE: 130 MMHG | HEART RATE: 67 BPM | HEIGHT: 69 IN | OXYGEN SATURATION: 97 % | TEMPERATURE: 97.5 F | WEIGHT: 209.4 LBS | DIASTOLIC BLOOD PRESSURE: 70 MMHG | BODY MASS INDEX: 31.01 KG/M2

## 2024-08-08 DIAGNOSIS — M54.9 OTHER ACUTE BACK PAIN: ICD-10-CM

## 2024-08-08 DIAGNOSIS — R06.09 DYSPNEA ON EXERTION: Primary | ICD-10-CM

## 2024-08-08 PROCEDURE — 1160F RVW MEDS BY RX/DR IN RCRD: CPT | Performed by: FAMILY MEDICINE

## 2024-08-08 PROCEDURE — 99214 OFFICE O/P EST MOD 30 MIN: CPT | Performed by: FAMILY MEDICINE

## 2024-08-08 PROCEDURE — 1123F ACP DISCUSS/DSCN MKR DOCD: CPT | Performed by: FAMILY MEDICINE

## 2024-08-08 PROCEDURE — 3008F BODY MASS INDEX DOCD: CPT | Performed by: FAMILY MEDICINE

## 2024-08-08 PROCEDURE — 1159F MED LIST DOCD IN RCRD: CPT | Performed by: FAMILY MEDICINE

## 2024-08-08 ASSESSMENT — ANXIETY QUESTIONNAIRES
4. TROUBLE RELAXING: NOT AT ALL
1. FEELING NERVOUS, ANXIOUS, OR ON EDGE: NOT AT ALL
7. FEELING AFRAID AS IF SOMETHING AWFUL MIGHT HAPPEN: NOT AT ALL
5. BEING SO RESTLESS THAT IT IS HARD TO SIT STILL: NOT AT ALL
6. BECOMING EASILY ANNOYED OR IRRITABLE: SEVERAL DAYS
GAD7 TOTAL SCORE: 1
IF YOU CHECKED OFF ANY PROBLEMS ON THIS QUESTIONNAIRE, HOW DIFFICULT HAVE THESE PROBLEMS MADE IT FOR YOU TO DO YOUR WORK, TAKE CARE OF THINGS AT HOME, OR GET ALONG WITH OTHER PEOPLE: NOT DIFFICULT AT ALL
2. NOT BEING ABLE TO STOP OR CONTROL WORRYING: NOT AT ALL
3. WORRYING TOO MUCH ABOUT DIFFERENT THINGS: NOT AT ALL

## 2024-08-08 ASSESSMENT — ENCOUNTER SYMPTOMS
OCCASIONAL FEELINGS OF UNSTEADINESS: 0
DEPRESSION: 0
LOSS OF SENSATION IN FEET: 0

## 2024-08-08 ASSESSMENT — PATIENT HEALTH QUESTIONNAIRE - PHQ9
SUM OF ALL RESPONSES TO PHQ9 QUESTIONS 1 AND 2: 0
1. LITTLE INTEREST OR PLEASURE IN DOING THINGS: NOT AT ALL
2. FEELING DOWN, DEPRESSED OR HOPELESS: NOT AT ALL

## 2024-08-08 NOTE — PROGRESS NOTES
"Subjective   Patient ID: Yunior Alvarez is a 69 y.o. male who presents for Back Pain (Lower back pain /right side ) and Shortness of Breath.    HPI   Low back pain: noticing more symptoms, stiffness in AM, usually across the lower back, sometimes does localize to right.  No sciatica.  Does not improve with medication.  Tries to stay active and does get better as day goes on.    SOB: intermittent SOB that started after COVID (around 2020), feels easily winded but not at all times, does not always have to be related to activity, very difficult to predict, no associated chest pain, no lung pain    Review of Systems  Negative unless noted in HPI    Objective   /70   Pulse (!) 130   Temp 36.4 °C (97.5 °F)   Ht 1.753 m (5' 9\")   Wt 95 kg (209 lb 6.4 oz)   SpO2 97%   BMI 30.92 kg/m²     Physical Exam  Constitutional:       Appearance: Normal appearance.   Cardiovascular:      Rate and Rhythm: Normal rate and regular rhythm.   Pulmonary:      Effort: Pulmonary effort is normal.      Breath sounds: No wheezing, rhonchi or rales.   Chest:      Chest wall: No tenderness.   Musculoskeletal:         General: No swelling, tenderness, deformity or signs of injury.      Right lower leg: No edema.      Left lower leg: No edema.   Neurological:      Mental Status: He is alert.         Assessment/Plan   Problem List Items Addressed This Visit             ICD-10-CM    Dyspnea on exertion - Primary R06.09     Pursue further evaluation  Rule out cardiac cause and then consider pulmonary workup  Start with stress echo for structure and function  Continue to monitor symptoms  Did discuss that if any worsening or progression of symptoms then seek emergent care, PVU         Relevant Orders    Echocardiogram Stress Test    Acute back pain M54.9     Likely muscular stiffness related to underlying OA and core weakness  Will obtain xray of the spine  Recommend tylenol, topical ointments, gentle stretch and ROM  Can consider formal " PT or further referral if needed         Relevant Orders    XR lumbar spine 2-3 views    XR thoracic spine 3 views

## 2024-08-08 NOTE — ASSESSMENT & PLAN NOTE
Likely muscular stiffness related to underlying OA and core weakness  Will obtain xray of the spine  Recommend tylenol, topical ointments, gentle stretch and ROM  Can consider formal PT or further referral if needed

## 2024-08-08 NOTE — ASSESSMENT & PLAN NOTE
Pursue further evaluation  Rule out cardiac cause and then consider pulmonary workup  Start with stress echo for structure and function  Continue to monitor symptoms  Did discuss that if any worsening or progression of symptoms then seek emergent care, PVU

## 2024-08-09 ENCOUNTER — HOSPITAL ENCOUNTER (OUTPATIENT)
Dept: RADIOLOGY | Facility: HOSPITAL | Age: 69
Discharge: HOME | End: 2024-08-09
Payer: MEDICARE

## 2024-08-09 DIAGNOSIS — M54.9 OTHER ACUTE BACK PAIN: ICD-10-CM

## 2024-08-09 PROCEDURE — 72072 X-RAY EXAM THORAC SPINE 3VWS: CPT

## 2024-08-09 PROCEDURE — 72100 X-RAY EXAM L-S SPINE 2/3 VWS: CPT

## 2024-08-29 ENCOUNTER — HOSPITAL ENCOUNTER (OUTPATIENT)
Dept: CARDIOLOGY | Facility: HOSPITAL | Age: 69
Discharge: HOME | End: 2024-08-29
Payer: MEDICARE

## 2024-08-29 DIAGNOSIS — R06.09 DYSPNEA ON EXERTION: ICD-10-CM

## 2024-08-29 PROCEDURE — 93017 CV STRESS TEST TRACING ONLY: CPT

## 2024-08-30 ENCOUNTER — APPOINTMENT (OUTPATIENT)
Dept: DERMATOLOGY | Facility: CLINIC | Age: 69
End: 2024-08-30
Payer: MEDICARE

## 2024-08-30 DIAGNOSIS — C44.519 BASAL CELL CARCINOMA (BCC) OF SKIN OF OTHER PART OF TORSO: ICD-10-CM

## 2024-08-30 PROCEDURE — 11603 EXC TR-EXT MAL+MARG 2.1-3 CM: CPT | Performed by: DERMATOLOGY

## 2024-08-30 PROCEDURE — 12032 INTMD RPR S/A/T/EXT 2.6-7.5: CPT | Performed by: DERMATOLOGY

## 2024-08-30 NOTE — PROGRESS NOTES
Excision Operative Note    Date of Surgery:  8/30/2024  Surgeon:  Madeleine Rivera MD  Office Location: [address]   [facility] [address]  [facility] [address]  Dept: [phone]  Dept Fax: [phone]  Referring Provider: Jhony Alexandre MD  [address]   [facility] [address]  [place] [id number]    Keisha Alvarez is a 69 y.o. male who presents for the following: Excision for basal cell carcinoma.    According to the patient, the lesion has been present for approximately 6 months at the time of diagnosis.  The lesion is not causing symptoms.  The lesion has not been treated previously.    The patient does not have a pacemaker / defibrillator.  The patient does not have a heart valve / joint replacement.    The patient is not on blood thinners.   The patient does not have a history of hepatitis B or C.  The patient does not have a history of HIV.  The patient does not have a history of immunosuppression (e.g. organ transplantation, malignancy, medications)    Is it okay to leave a phone message with results?Yes  Who else may we leave results with: (name, relationship) Ming(wife)    The following portions of the chart were reviewed this encounter and updated as appropriate:         Assessment/Plan   Pre-procedure:   Obtained informed consent: written from patient  The surgical site was identified and confirmed with the patient.     Intra-operative:   Audible time out called at : 1:13 PM 08/30/24  by: Amber Cao MA   Verified patient name, birthdate, site, specimen bottle label & requisition.    The planned procedure(s) was again reviewed with the patient. The risks of bleeding, infection, nerve damage and scarring were reviewed. The patient identity, surgical site, and planned procedure(s) were verified.     Biopsy Accession Number: [id number]  Basal cell carcinoma (BCC) of skin of other part of torso  Left Lateral Mid Abdomen    Skin  excision    Lesion length (cm):  1.2  Lesion width (cm):  0.8  Margin per side (cm):  0.5  Total excision diameter (cm):  2.2  Informed consent: discussed and consent obtained    Timeout: patient name, date of birth, surgical site, and procedure verified    Procedure prep:  Patient prepped in sterile fashion  Anesthesia: the lesion was anesthetized in a standard fashion    Anesthetic:  1% lidocaine w/ epinephrine 1-100,000 local infiltration  Instrument used: #15 blade    Hemostasis achieved with: electrodesiccation    Outcome: patient tolerated procedure well with no complications    Post-procedure details: sterile dressing applied and wound care instructions given    Dressing type: pressure dressing    Additional details:  The nature of the diagnosis was explained. The lesion is a skin cancer.  It is locally aggressive but has a very low to non-existent risk of spreading.  The condition is associated with sun exposure.  Warning signs of non-melanoma skin cancer discussed. Patient was instructed to perform monthly self skin examination.  We recommended that the patient have regular full skin exams given an increased risk of subsequent skin cancers. The patient was instructed to use sun protective behaviors including use of broad spectrum sunscreens and sun protective clothing to reduce risk of skin cancers.  Excision was discussed with the patient. The risks, benefits and potential adverse effects were reviewed. Discussion included but was not limited to the cure rate, relative cost, wound care requirements, activity restrictions, likely scar outcome and time to heal were reviewed. It was explained that the scar would be longer than the original lesion.  The patient elected to proceed with exicision today.    Skin repair  Complexity:  Intermediate  Final length (cm):  6  Informed consent: discussed and consent obtained    Timeout: patient name, date of birth, surgical site, and procedure verified    Procedure prep:   Patient prepped in sterile fashion  Anesthesia: the lesion was anesthetized in a standard fashion    Anesthetic:  1% lidocaine w/ epinephrine 1-100,000 local infiltration  Reason for type of repair: reduce tension to allow closure    Undermining: edges undermined    Subcutaneous layers (deep stitches):   Suture size:  3-0  Suture type: Vicryl (polyglactin 910)    Stitches:  Buried vertical mattress  Fine/surface layer approximation (top stitches):   Suture size:  5-0  Suture type: fast-absorbing plain gut    Stitches: simple running    Hemostasis achieved with: suture, pressure and electrodesiccation  Outcome: patient tolerated procedure well with no complications    Post-procedure details: sterile dressing applied and wound care instructions given    Dressing type: pressure dressing      Specimen 1 - Dermatopathology- DERM LAB  Differential Diagnosis: BCC  Check Margins Yes/No?:  YES  Dermpath Lab: Routine Histopathology (formalin-fixed tissue)    Intermediate Linear Repair:  Given the location and size of the defect, it was determined that an intermediate layered linear closure was required to restore normal anatomy and function. The repair is an intermediate closure as two layers of sutures were required. The defect was undermined extensively at the level of the subcutaneous plane. Standing cutaneous cones were removed using Burow's triangles. The wound edges were brought into close approximation with buried vertical mattress sutures. The remainder of the wound was then closed with epidermal top sutures.    The final repair measured 6.0 cm    Wound care was discussed, and the patient was given written post-operative wound care instructions.      The patient will follow up with Madeleine Rivera MD as needed for any post operative problems or concerns, and will follow up with their primary dermatologist as scheduled.

## 2024-09-27 ENCOUNTER — APPOINTMENT (OUTPATIENT)
Dept: PRIMARY CARE | Facility: CLINIC | Age: 69
End: 2024-09-27
Payer: MEDICARE

## 2024-09-27 VITALS
OXYGEN SATURATION: 96 % | HEIGHT: 69 IN | BODY MASS INDEX: 31.19 KG/M2 | TEMPERATURE: 97.8 F | HEART RATE: 50 BPM | WEIGHT: 210.6 LBS | DIASTOLIC BLOOD PRESSURE: 70 MMHG | SYSTOLIC BLOOD PRESSURE: 115 MMHG

## 2024-09-27 DIAGNOSIS — Z12.12 SCREENING FOR COLORECTAL CANCER: ICD-10-CM

## 2024-09-27 DIAGNOSIS — Z00.00 ROUTINE GENERAL MEDICAL EXAMINATION AT HEALTH CARE FACILITY: Primary | ICD-10-CM

## 2024-09-27 DIAGNOSIS — Z12.5 SCREENING FOR PROSTATE CANCER: ICD-10-CM

## 2024-09-27 DIAGNOSIS — Z13.6 SCREENING FOR CARDIOVASCULAR CONDITION: ICD-10-CM

## 2024-09-27 DIAGNOSIS — Z12.11 SCREENING FOR COLORECTAL CANCER: ICD-10-CM

## 2024-09-27 PROBLEM — M54.9 ACUTE BACK PAIN: Status: RESOLVED | Noted: 2024-08-08 | Resolved: 2024-09-27

## 2024-09-27 PROBLEM — L90.5 SCAR CONDITION AND FIBROSIS OF SKIN: Status: RESOLVED | Noted: 2023-08-03 | Resolved: 2024-09-27

## 2024-09-27 PROBLEM — L91.8 OTHER HYPERTROPHIC DISORDERS OF THE SKIN: Status: RESOLVED | Noted: 2023-08-03 | Resolved: 2024-09-27

## 2024-09-27 PROBLEM — D48.5 NEOPLASM OF UNCERTAIN BEHAVIOR OF SKIN: Status: RESOLVED | Noted: 2023-08-03 | Resolved: 2024-09-27

## 2024-09-27 PROBLEM — R21 RASH AND OTHER NONSPECIFIC SKIN ERUPTION: Status: RESOLVED | Noted: 2023-08-03 | Resolved: 2024-09-27

## 2024-09-27 PROBLEM — M25.561 ACUTE PAIN OF RIGHT KNEE: Status: RESOLVED | Noted: 2023-09-06 | Resolved: 2024-09-27

## 2024-09-27 PROBLEM — L98.9 NON-HEALING SKIN LESION: Status: RESOLVED | Noted: 2023-10-30 | Resolved: 2024-09-27

## 2024-09-27 PROBLEM — L81.4 OTHER MELANIN HYPERPIGMENTATION: Status: RESOLVED | Noted: 2023-08-03 | Resolved: 2024-09-27

## 2024-09-27 PROBLEM — R06.09 DYSPNEA ON EXERTION: Status: RESOLVED | Noted: 2024-08-08 | Resolved: 2024-09-27

## 2024-09-27 LAB
ALBUMIN SERPL BCP-MCNC: 4.4 G/DL (ref 3.4–5)
ALP SERPL-CCNC: 42 U/L (ref 33–136)
ALT SERPL W P-5'-P-CCNC: 15 U/L (ref 10–52)
ANION GAP SERPL CALC-SCNC: 15 MMOL/L (ref 10–20)
AST SERPL W P-5'-P-CCNC: 19 U/L (ref 9–39)
BILIRUB SERPL-MCNC: 1 MG/DL (ref 0–1.2)
BUN SERPL-MCNC: 22 MG/DL (ref 6–23)
CALCIUM SERPL-MCNC: 9.6 MG/DL (ref 8.6–10.6)
CHLORIDE SERPL-SCNC: 104 MMOL/L (ref 98–107)
CHOLEST SERPL-MCNC: 190 MG/DL (ref 0–199)
CHOLESTEROL/HDL RATIO: 4.2
CO2 SERPL-SCNC: 24 MMOL/L (ref 21–32)
CREAT SERPL-MCNC: 1.17 MG/DL (ref 0.5–1.3)
EGFRCR SERPLBLD CKD-EPI 2021: 67 ML/MIN/1.73M*2
ERYTHROCYTE [DISTWIDTH] IN BLOOD BY AUTOMATED COUNT: 13.2 % (ref 11.5–14.5)
GLUCOSE SERPL-MCNC: 103 MG/DL (ref 74–99)
HCT VFR BLD AUTO: 47.6 % (ref 41–52)
HDLC SERPL-MCNC: 45.7 MG/DL
HGB BLD-MCNC: 15.8 G/DL (ref 13.5–17.5)
LDLC SERPL CALC-MCNC: 120 MG/DL
MCH RBC QN AUTO: 31.3 PG (ref 26–34)
MCHC RBC AUTO-ENTMCNC: 33.2 G/DL (ref 32–36)
MCV RBC AUTO: 94 FL (ref 80–100)
NON HDL CHOLESTEROL: 144 MG/DL (ref 0–149)
NRBC BLD-RTO: 0 /100 WBCS (ref 0–0)
PLATELET # BLD AUTO: 194 X10*3/UL (ref 150–450)
POTASSIUM SERPL-SCNC: 5.1 MMOL/L (ref 3.5–5.3)
PROT SERPL-MCNC: 7.4 G/DL (ref 6.4–8.2)
PSA SERPL-MCNC: 0.85 NG/ML
RBC # BLD AUTO: 5.05 X10*6/UL (ref 4.5–5.9)
SODIUM SERPL-SCNC: 138 MMOL/L (ref 136–145)
TRIGL SERPL-MCNC: 124 MG/DL (ref 0–149)
VLDL: 25 MG/DL (ref 0–40)
WBC # BLD AUTO: 5.3 X10*3/UL (ref 4.4–11.3)

## 2024-09-27 PROCEDURE — 1123F ACP DISCUSS/DSCN MKR DOCD: CPT | Performed by: FAMILY MEDICINE

## 2024-09-27 PROCEDURE — 80053 COMPREHEN METABOLIC PANEL: CPT

## 2024-09-27 PROCEDURE — 3008F BODY MASS INDEX DOCD: CPT | Performed by: FAMILY MEDICINE

## 2024-09-27 PROCEDURE — 80061 LIPID PANEL: CPT

## 2024-09-27 PROCEDURE — 1160F RVW MEDS BY RX/DR IN RCRD: CPT | Performed by: FAMILY MEDICINE

## 2024-09-27 PROCEDURE — G0439 PPPS, SUBSEQ VISIT: HCPCS | Performed by: FAMILY MEDICINE

## 2024-09-27 PROCEDURE — 1170F FXNL STATUS ASSESSED: CPT | Performed by: FAMILY MEDICINE

## 2024-09-27 PROCEDURE — 1159F MED LIST DOCD IN RCRD: CPT | Performed by: FAMILY MEDICINE

## 2024-09-27 PROCEDURE — G0103 PSA SCREENING: HCPCS

## 2024-09-27 PROCEDURE — 85027 COMPLETE CBC AUTOMATED: CPT

## 2024-09-27 PROCEDURE — 1158F ADVNC CARE PLAN TLK DOCD: CPT | Performed by: FAMILY MEDICINE

## 2024-09-27 ASSESSMENT — ACTIVITIES OF DAILY LIVING (ADL)
MANAGING_FINANCES: INDEPENDENT
GROCERY_SHOPPING: INDEPENDENT
BATHING: INDEPENDENT
TAKING_MEDICATION: INDEPENDENT
DRESSING: INDEPENDENT
DOING_HOUSEWORK: INDEPENDENT

## 2024-09-27 ASSESSMENT — PATIENT HEALTH QUESTIONNAIRE - PHQ9
2. FEELING DOWN, DEPRESSED OR HOPELESS: NOT AT ALL
SUM OF ALL RESPONSES TO PHQ9 QUESTIONS 1 AND 2: 0
1. LITTLE INTEREST OR PLEASURE IN DOING THINGS: NOT AT ALL

## 2024-09-27 ASSESSMENT — ENCOUNTER SYMPTOMS
OCCASIONAL FEELINGS OF UNSTEADINESS: 1
DEPRESSION: 0
LOSS OF SENSATION IN FEET: 0

## 2024-09-27 NOTE — ASSESSMENT & PLAN NOTE
Recommended screening guidelines addressed and orders placed as indicated by age and chronic conditions  Screening labs ordered, will call with results  Due for colkarina  Continue to work on healthy lifestyle including well balanced diet, regular activity, limit alcohol, no tobacco products Follow up annually

## 2024-09-27 NOTE — PROGRESS NOTES
"Subjective   Reason for Visit: Yunior Alvarez is an 69 y.o. male here for a Medicare Wellness visit.     HPI  Presents for follow up for wellness visit    Had stress test done and normal, symptoms resolved    Follows with dermatology regularly    Patient Care Team:  Rhonda Hinkle MD as PCP - General (Family Medicine)  Rhonda Hinkle MD as PCP - [facility]     Review of Systems  Negative unless noted in HPI    Objective   Vitals:  /70   Pulse 50   Temp 36.6 °C (97.8 °F)   Ht 1.753 m (5' 9\")   Wt 95.5 kg (210 lb 9.6 oz)   SpO2 96%   BMI 31.10 kg/m²       Physical Exam  Constitutional:       Appearance: Normal appearance.   HENT:      Head: Normocephalic and atraumatic.      Right Ear: External ear normal.      Left Ear: External ear normal.      Nose: Nose normal.   Eyes:      Extraocular Movements: Extraocular movements intact.      Conjunctiva/sclera: Conjunctivae normal.      Pupils: Pupils are equal, round, and reactive to light.   Cardiovascular:      Rate and Rhythm: Normal rate and regular rhythm.   Pulmonary:      Effort: Pulmonary effort is normal.      Breath sounds: Normal breath sounds.   Musculoskeletal:      Cervical back: Normal range of motion and neck supple.   Skin:     General: Skin is warm.   Neurological:      General: No focal deficit present.      Mental Status: He is alert and oriented to person, place, and time.   Psychiatric:         Mood and Affect: Mood normal.         Assessment & Plan  Routine general medical examination at health care facility  Recommended screening guidelines addressed and orders placed as indicated by age and chronic conditions  Screening labs ordered, will call with results  Due for johanny  Continue to work on healthy lifestyle including well balanced diet, regular activity, limit alcohol, no tobacco products Follow up annually           Screening for colorectal cancer    Orders:    Cologuard®; Future    Cologuard®    CBC    Screening for " cardiovascular condition    Orders:    Comprehensive Metabolic Panel    Lipid Panel    Screening for prostate cancer    Orders:    Prostate Specific Antigen

## 2024-10-08 LAB — NONINV COLON CA DNA+OCC BLD SCRN STL QL: NEGATIVE

## 2024-11-01 ENCOUNTER — APPOINTMENT (OUTPATIENT)
Dept: DERMATOLOGY | Facility: CLINIC | Age: 69
End: 2024-11-01
Payer: MEDICARE

## 2024-11-07 ENCOUNTER — APPOINTMENT (OUTPATIENT)
Dept: DERMATOLOGY | Facility: CLINIC | Age: 69
End: 2024-11-07
Payer: MEDICARE

## 2024-11-07 DIAGNOSIS — D48.5 NEOPLASM OF UNCERTAIN BEHAVIOR OF SKIN: Primary | ICD-10-CM

## 2024-11-07 DIAGNOSIS — L30.9 DERMATITIS: ICD-10-CM

## 2024-11-07 DIAGNOSIS — Z85.828 HISTORY OF NONMELANOMA SKIN CANCER: ICD-10-CM

## 2024-11-07 DIAGNOSIS — D22.5 MELANOCYTIC NEVUS OF TRUNK: ICD-10-CM

## 2024-11-07 DIAGNOSIS — L72.0 EPIDERMOID CYST: ICD-10-CM

## 2024-11-07 DIAGNOSIS — L21.9 SEBORRHEIC DERMATITIS: ICD-10-CM

## 2024-11-07 DIAGNOSIS — D18.01 HEMANGIOMA OF SKIN: ICD-10-CM

## 2024-11-07 DIAGNOSIS — L57.8 DIFFUSE PHOTODAMAGE OF SKIN: ICD-10-CM

## 2024-11-07 DIAGNOSIS — L82.1 SEBORRHEIC KERATOSIS: ICD-10-CM

## 2024-11-07 DIAGNOSIS — L57.0 ACTINIC KERATOSIS: ICD-10-CM

## 2024-11-09 RX ORDER — FLUOROURACIL 50 MG/G
CREAM TOPICAL
Qty: 40 G | Refills: 2 | Status: SHIPPED | OUTPATIENT
Start: 2024-11-09

## 2024-11-09 RX ORDER — TRIAMCINOLONE ACETONIDE 0.25 MG/G
CREAM TOPICAL
Qty: 80 G | Refills: 0 | Status: SHIPPED | OUTPATIENT
Start: 2024-11-09

## 2024-11-09 RX ORDER — KETOCONAZOLE 20 MG/G
CREAM TOPICAL
Qty: 60 G | Refills: 11 | Status: SHIPPED | OUTPATIENT
Start: 2024-11-09

## 2024-11-09 NOTE — PROGRESS NOTES
Subjective     Yunior Alvarez is a 69 y.o. male who presents for the following: Skin Check.  He notes a bump on his upper back, which has been present for several months.  It frequently itches and sometimes hurts, but it has not changed in any other way, including in size, shape, or color, and it does not bleed or drain.  He also notes dry, flaky patches on his face, especially on his inner cheeks.  He denies any other new, changing, or concerning skin lesions since his last visit; no bleeding, itching, or burning lesions.      Review of Systems:  No other skin or systemic complaints other than what is documented elsewhere in the note.    The following portions of the chart were reviewed this encounter and updated as appropriate:       Skin Cancer History  Biopsy Date Type Location Status   11/2/23 BCC Right Anterior Proximal Arm Refer Mohs/Surgeon  8/30/24 5/2/24 BCC Left Lateral Mid-Abdomen Treatment Complete  8/30/24     Specialty Problems          Dermatology Problems    Basal cell carcinoma of skin of other part of trunk    Basal cell carcinoma of skin of right lower limb, including hip    Basal cell carcinoma of skin of right upper limb, including shoulder    Basal cell carcinoma of skin of unspecified parts of face    Carcinoma in situ of skin of trunk    Hemangioma of skin and subcutaneous tissue    Inflamed seborrheic keratosis    Multiple actinic keratoses    Other seborrheic keratosis    Personal history of other malignant neoplasm of skin    Seborrheic dermatitis, unspecified    Skin changes due to chronic exposure to nonionizing radiation, unspecified    Squamous cell carcinoma of skin of left upper limb, including shoulder    Squamous cell carcinoma of skin of left lower limb, including hip    Squamous cell carcinoma of skin of other part of trunk    Squamous cell carcinoma of skin of right upper limb, including shoulder    Basal cell carcinoma    Skin cancer       Past Dermatologic / Past Relevant  Medical History:    - history of several nonmelanoma skin cancers, including:    - most recently BCC on left lateral mid abdomen diagnosed on 5/2/24 s/p wide local excision with 5-mm margins by Dr. Rivera on 8/30/24  - BCC on right anterior proximal arm diagnosed on 11/2/23 s/p wide local excision with 5-mm margins by Dr. Rivera on 8/2/24  - SCC in situ on left lateral antecubital fossa and 2 BCCs on left medial lower back and right lateral mid lower back all 3 diagnosed on 4/6/23 and s/p Mohs surgery by Dr. Rivera on 5/8/23 and then 2 wide local excisions each with 5-mm margins by Dr. Rivera both on 5/15/23, respectively  - BCC on mid upper abdomen diagnosed on 11/3/22 s/p wide local excision with 5-mm margins by Dr. Rivera on 1/27/23  - SCC on right posterior proximal arm, BCC on right upper chest, and SCC in situ on right lateral mid back lateral all 3 diagnosed on 5/5/22 and s/p Mohs surgery by Dr. Betancourt on 8/16/22, wide local excision with 5-mm margins by Dr. Betancourt on 8/23/22, and ED&C by Dr. Corea on 11/15/22, respectively  - BCC on left lateral upper forehead diagnosed on 6/28/21 s/p Mohs surgery by Dr. Betancourt on 9/2/21  - BCC on right upper back diagnosed on 1/8/21 s/p ED&C on 3/8/21  - previously BCCs on right medial proximal leg, right anterior proximal leg, right anterior mid leg, and right medial lower back all 4 diagnosed on 9/11/20 s/p ED&C on 11/12/20  - history of SCC in situ on left anterior distal leg diagnosed on 5/15/20 s/p ED&C on 6/12/20 - history of BCC on left medial mid-abdomen diagnosed on 2/14/20 s/p ED&C on 5/15/20 - SCC in situ on left upper chest and BCCs on right anterior shoulder and right shoulder proximal all 3 diagnosed on 7/18/19 and s/p ED&C at last visit on 8/22/20  - history of SCC on left anterior shoulder diagnosed at initial visit on 10/5/18 s/p wide local excision with 5-mm margins by Dr. Choi on 10/22/18  - history of 2 BCCs on right dorsal hand and right shoulder  diagnosed at initial visit on 10/5/18 and s/p Mohs surgery by Dr. Betancourt on 12/13/18 and ED&C on 11/1/18, respectively    - AKs s/p course of topical field therapy with Efudex 5% cream completed on:  - face and scalp in January 2019  - chest in November 2019  - left dorsal hand forearm and arm in March 2020  - forehead and scalp in January 2023  - upper chest and right antecubital fossa in March 2024    - no history of melanoma    Family History:    Mother - nonmelanoma skin cancers of unknown type  No family history of melanoma    Social History:    The patient is retired from working for [employer] and lives in [place] with his wife, and they have 4 children, all of whom live in [place], including 2 in [place]; he enjoys motorcycling and used to use tanning booths regularly; his 2 grandsons play hockey, including one for his high school in [place]; he recently went on a motorcycle rally in [place]      Allergies:  Patient has no known allergies.    Current Medications / CAM's:    Current Outpatient Medications:      mg tablet, Take 1 tablet (800 mg) by mouth every 6 hours if needed., Disp: , Rfl:     ketoconazole (NIZOral) 2 % shampoo, Apply topically 3 times a week., Disp: 120 mL, Rfl: 11    triamcinolone (Kenalog) 0.025 % cream, Apply twice daily to affected areas (avoid the eyes) for 1-2 weeks following course of Efudex as directed, Disp: 80 g, Rfl: 0    fluorouracil (Efudex) 5 % cream cream, Apply to the affected ju of the face twice daily for a total of 3 weeks. Wash hands thoroughly after each application., Disp: 40 g, Rfl: 2    ketoconazole (NIZOral) 2 % cream, Apply twice daily to affected areas of face, Disp: 60 g, Rfl: 11    triamcinolone (Kenalog) 0.025 % cream, Apply twice daily to affected areas (avoid the eyes) for 1-2 weeks following course of Efudex as directed, Disp: 80 g, Rfl: 0     Objective   Well appearing patient in no apparent distress; mood and affect are within normal limits.    A  full examination was performed including scalp, face, eyes, ears, nose, lips, neck, chest, axillae, abdomen, back, bilateral upper extremities, and bilateral lower extremities. All findings within normal limits unless otherwise noted below.    Assessment/Plan   1. Neoplasm of uncertain behavior of skin (4)  Right Medial Mid-Abdomen  7 mm erythematous, scaly papule           Shave removal    Lesion length (cm):  0.7  Margin per side (cm):  0  Lesion diameter (cm):  0.7  Informed consent: discussed and consent obtained    Timeout: patient name, date of birth, surgical site, and procedure verified    Procedure prep:  Patient was prepped and draped  Anesthesia: the lesion was anesthetized in a standard fashion    Anesthetic:  1% lidocaine w/ epinephrine 1-100,000 local infiltration  Instrument used: flexible razor blade    Hemostasis achieved with: aluminum chloride    Outcome: patient tolerated procedure well    Post-procedure details: sterile dressing applied and wound care instructions given    Dressing type: bandage and petrolatum      Staff Communication: Dermatology Local Anesthesia: 1 % Lidocaine / Epinephrine - Amount:0.5ml    Specimen 1 - Dermatopathology- DERM LAB  Differential Diagnosis: BCC v AK v SCCIS  Check Margins Yes/No?:    Comments:    Dermpath Lab: Routine Histopathology (formalin-fixed tissue)    Mid-Back  8 mm erythematous, scaly papule     Shave removal    Lesion length (cm):  0.8  Margin per side (cm):  0  Lesion diameter (cm):  0.8  Informed consent: discussed and consent obtained    Timeout: patient name, date of birth, surgical site, and procedure verified    Procedure prep:  Patient was prepped and draped  Anesthesia: the lesion was anesthetized in a standard fashion    Anesthetic:  1% lidocaine w/ epinephrine 1-100,000 local infiltration  Instrument used: flexible razor blade    Hemostasis achieved with: aluminum chloride    Outcome: patient tolerated procedure well    Post-procedure  details: sterile dressing applied and wound care instructions given    Dressing type: bandage and petrolatum      Staff Communication: Dermatology Local Anesthesia: 1 % Lidocaine / Epinephrine - Amount:0.5ml    Specimen 2 - Dermatopathology- DERM LAB  Differential Diagnosis: BCC v AK v SCCIS  Check Margins Yes/No?:    Comments:    Dermpath Lab: Routine Histopathology (formalin-fixed tissue)    Right Medial Mid-Back  8 mm erythematous, scaly papule           Shave removal    Lesion length (cm):  0.8  Margin per side (cm):  0  Lesion diameter (cm):  0.8  Informed consent: discussed and consent obtained    Timeout: patient name, date of birth, surgical site, and procedure verified    Procedure prep:  Patient was prepped and draped  Anesthesia: the lesion was anesthetized in a standard fashion    Anesthetic:  1% lidocaine w/ epinephrine 1-100,000 local infiltration  Instrument used: flexible razor blade    Hemostasis achieved with: aluminum chloride    Outcome: patient tolerated procedure well    Post-procedure details: sterile dressing applied and wound care instructions given    Dressing type: bandage and petrolatum      Staff Communication: Dermatology Local Anesthesia: 1 % Lidocaine / Epinephrine - Amount:0.5ml    Specimen 3 - Dermatopathology- DERM LAB  Differential Diagnosis: BCC v AK v SCCIS  Check Margins Yes/No?:    Comments:    Dermpath Lab: Routine Histopathology (formalin-fixed tissue)    Left Anterior Distal Leg  7 mm erythematous, scaly papule           Shave removal    Lesion length (cm):  0.7  Margin per side (cm):  0  Lesion diameter (cm):  0.7  Informed consent: discussed and consent obtained    Timeout: patient name, date of birth, surgical site, and procedure verified    Procedure prep:  Patient was prepped and draped  Anesthesia: the lesion was anesthetized in a standard fashion    Anesthetic:  1% lidocaine w/ epinephrine 1-100,000 local infiltration  Instrument used: flexible razor blade     Hemostasis achieved with: aluminum chloride    Outcome: patient tolerated procedure well    Post-procedure details: sterile dressing applied and wound care instructions given    Dressing type: bandage and petrolatum      Staff Communication: Dermatology Local Anesthesia: 1 % Lidocaine / Epinephrine - Amount:0.5ml    Specimen 4 - Dermatopathology- DERM LAB  Differential Diagnosis: BCC v AK v SCCIS  Check Margins Yes/No?:    Comments:    Dermpath Lab: Routine Histopathology (formalin-fixed tissue)    2. Actinic keratosis (25)  Right Forearm - Anterior (25)  On the patient's right medial mid dorsal forearm, there is a pink, well-healed scar at the recent biopsy site with a surrounding rim of erythema, grittiness, and scale; scattered on the patient's bilateral dorsal forearms, face, scalp, and bilateral ears, there are multiple erythematous, gritty, scaly macules    Biopsy-proven Actinic Keratosis and additional AKs -right medial mid dorsal forearm, present on the deep and peripheral margins, and scattered on bilateral dorsal forearms, face, scalp, and bilateral ears, respectively.  The pre-cancerous nature of these lesions and treatment options, including liquid nitrogen cryotherapy and field therapy, such as with a course of topical therapy with Efudex 5% cream, were discussed extensively with the patient today.  At this time, I recommend treatment with liquid nitrogen cryotherapy in the office today.  In addition, following cryotherapy, I recommend topical field therapy with a course of Efudex 5% cream, which the patient was instructed to apply twice daily to affected areas of face, scalp, and/or forearms for 2-3 weeks.  The risks, benefits, and side effects of this medication, including the redness and irritation expected with its use, were discussed; the patient was instructed to discontinue use of the medication earlier if necessary due to excessive irritation.  I also prescribed topical steroid therapy with  Triamcinolone 0.025% cream, which the patient may apply twice daily to affected areas for up to 7-10 days as needed for inflammation following the course of Efudex, which the patient plans to complete in January 2025.  The risks, benefits, and side effects of these medications, including the redness, irritation, and discomfort associated with Efudex use as well as possible skin atrophy with overuse of topical steroids, were discussed at length.  I will have the patient return to my office in 3 to 4 months, pending the above biopsy results, for follow-up.  The patient expressed understanding, is in agreement with this plan, and wishes to proceed with cryotherapy today.    Destr of lesion - Right Forearm - Anterior (25)  Complexity: simple    Destruction method: cryotherapy    Informed consent: discussed and consent obtained    Lesion destroyed using liquid nitrogen: Yes    Cryotherapy cycles:  1  Outcome: patient tolerated procedure well with no complications    Post-procedure details: wound care instructions given      fluorouracil (Efudex) 5 % cream cream - Right Forearm - Anterior (25)  Apply to the affected ju of the face twice daily for a total of 3 weeks. Wash hands thoroughly after each application.    triamcinolone (Kenalog) 0.025 % cream - Right Forearm - Anterior (25)  Apply twice daily to affected areas (avoid the eyes) for 1-2 weeks following course of Efudex as directed    3. Dermatitis    Related Medications  triamcinolone (Kenalog) 0.025 % cream  Apply twice daily to affected areas (avoid the eyes) for 1-2 weeks following course of Efudex as directed    triamcinolone (Kenalog) 0.025 % cream  Apply twice daily to affected areas (avoid the eyes) for 1-2 weeks following course of Efudex as directed    4. Seborrheic dermatitis  On the patient's face, mainly the glabella and bilateral eyebrows and perinasal creases, there are pink, scaly patches with whitish-yellowish, greasy scale    Seborrheic  Dermatitis - flare on face.  The potentially chronic and intermittently flaring nature of this condition and treatment options were discussed extensively with the patient today.  At this time, I recommend topical anti-fungal therapy with Ketoconazole 2% cream, which the patient was instructed to apply twice daily to the affected areas of the face.  The risks, benefits, and side effects of this medication were discussed.  The patient expressed understanding and is in agreement with this plan.    ketoconazole (NIZOral) 2 % cream  Apply twice daily to affected areas of face    Related Medications  ketoconazole (NIZOral) 2 % shampoo  Apply topically 3 times a week.    5. Epidermoid cyst  Mid Back  On the patient's mid upper back, there is a 1 cm round, flesh-colored, mobile, non-tender, subcutaneous, cystic-appearing nodule with a central punctum    Epidermoid Cyst -mid upper back.  The benign nature of this cystic lesion was discussed with the patient today and reassurance provided.  No treatment is medically indicated for this lesion at this time.  However, given the history the patient provides of frequent associated symptoms, the patient wishes to have the lesion removed if possible.  Thus, I discussed the possibility of undergoing surgical excision of the cyst for definitive removal.  After discussing the risks and benefits of the procedure, the patient expressed understanding and wishes to have the cyst excised.  Thus, the patient was provided a referral today to our Dermatologic surgery unit for further evaluation and consideration of cyst excision.  The patient expressed understanding, is in agreement with this plan, and will anticipate a phone call from our surgery unit within the next 1-2 weeks to schedule the surgery.    Referral to Dermatology - Mohs Surgery - Mid Back    6. Melanocytic nevus of trunk  Scattered on the patient's face, neck, trunk, and extremities, there are several small, round- to  oval-shaped, brown-pigmented and pink-colored, symmetric, uniform-appearing macules and dome-shaped papules    Clinically benign- to slightly atypical-appearing nevi - the clinically benign- to slightly atypical-appearing nature of the patient's nevi was discussed with the patient today.  None of the patient's nevi meet threshold for biopsy today.  I emphasized the importance of performing monthly self-skin exams using the ABCDs of monitoring moles, which were reviewed with the patient today and an informational hand-out provided.  I also emphasized the importance of sun avoidance and sun protection with daily sunscreen use.  The patient expressed understanding and is in agreement with this plan.    7. Seborrheic keratosis  Scattered on the patient's face, neck, trunk, and extremities, there are multiple tan- to light brown-colored, hyperkeratotic, stuck-on appearing papules of varying size and shape    Seborrheic Keratoses - the benign nature of these lesions was discussed with the patient today and reassurance provided.  No treatment is medically indicated for these lesions at this time.    8. Hemangioma of skin  Scattered on the patient's face, neck, trunk, and extremities, there are multiple small, round, cherry red- to purplish-colored, symmetric, uniform, vascular-appearing macules and papules    Cherry Angiomas - the benign nature of these vascular lesions was discussed with the patient today and reassurance provided.  No treatment is medically indicated for these lesions at this time.    9. History of nonmelanoma skin cancer  On the patient's left lateral mid abdomen, right anterior proximal arm, left lateral antecubital fossa, left medial lower back, right lateral mid lower back, mid upper abdomen, and scattered on his face, neck, trunk, and extremities, there are well-healed scars with no evidence of recurrent growth on exam today.    History of nonmelanoma skin cancers and actinic keratoses and  photodamage.  There is no evidence of recurrence at the sites of the patient's previously treated NMSCs on exam today.  The signs and symptoms of skin cancer were reviewed and the patient was advised to practice sun protection and sun avoidance, use daily sunscreen, and perform regular self skin exams.  I will have the patient return to our office in 3 to 4 months, pending the above biopsy results, for routine follow-up and skin exam, and the patient was instructed to call our office should the patient notice any new, changing, symptomatic, or otherwise concerning skin lesions before then.  The patient expressed understanding and is in agreement with this plan.    10. Diffuse photodamage of skin  Photodistributed  Diffuse photodamage with actinic changes with telangiectasia and mottled pigmentation in sun-exposed areas.    Photodamage.  The signs and symptoms of skin cancer were reviewed and the patient was advised to practice sun protection and sun avoidance, use daily sunscreen, and perform regular self skin exams.  Sun protection was discussed, including avoiding the mid-day sun, wearing a sunscreen with SPF at least 50, and stressing the need for reapplication of sunscreen and applying more than they think they need.

## 2025-01-31 ENCOUNTER — APPOINTMENT (OUTPATIENT)
Dept: DERMATOLOGY | Facility: CLINIC | Age: 70
End: 2025-01-31
Payer: MEDICARE

## 2025-01-31 VITALS — DIASTOLIC BLOOD PRESSURE: 72 MMHG | HEART RATE: 66 BPM | SYSTOLIC BLOOD PRESSURE: 116 MMHG

## 2025-01-31 DIAGNOSIS — C44.719 BASAL CELL CARCINOMA (BCC) OF SKIN OF LEFT LOWER EXTREMITY INCLUDING HIP: ICD-10-CM

## 2025-01-31 NOTE — PROGRESS NOTES
Mohs Surgery Operative Note    Date of Surgery:  1/31/2025  Surgeon:  Madeleine Rivera MD  Office Location: [address]   [facility] [address]  [facility] [address]  Dept: [phone]  Dept Fax: [phone]  Referring Provider: Jhony Alexandre MD  [address] [place] [address] Dr Jose M Pickard, [place] [id number]    Assessment/Plan   Pre-procedure:   Obtained informed consent: written from patient  The surgical site was identified and confirmed with the patient.     Intra-operative:   Audible time out called at : 10:44AM 01/31/25  by: Higinio Suarez RN   Verified patient name, birthdate, site, specimen bottle label & requisition.    The planned procedure(s) was again reviewed with the patient. The risks of bleeding, infection, nerve damage and scarring were reviewed. Written authorization was obtained. The patient identity, surgical site, and planned procedure(s) were verified. The provider acted as both surgeon and pathologist.     Basal cell carcinoma (BCC) of skin of left lower extremity including hip  Left Anterior Distal Leg  Mohs surgery  Consent obtained: written    Universal Protocol:  Procedure explained and questions answered to patient or proxy's satisfaction: Yes    Test results available and properly labeled: Yes    Pathology report reviewed: Yes    External notes reviewed: Yes    Photo or diagram used for site identification: Yes    Site/side marked: Yes    Slide independently reviewed by Mohs surgeon: Yes    Immediately prior to procedure a time out was called: Yes    Patient identity confirmed: verbally with patient  Preparation: Patient was prepped and draped in usual sterile fashion      Anticoagulation:  Is the patient taking prescription anticoagulant and/or aspirin prescribed/recommended by a physician? No    Was the anticoagulation regimen changed prior to Mohs? No      Anesthesia:  Anesthesia method: local infiltration  Local anesthetic: lidocaine 1% WITH  epi    Procedure Details:  Case ID Number: [id number]  Biopsy accession number: [id number]  Date of biopsy: 11/7/2024  Pre-Op diagnosis: basal cell carcinoma  BCC subtype: superficial  Surgical site (from skin exam): Left Anterior Distal Leg  Pre-operative length (cm): 0.8  Pre-operative width (cm): 0.6  Indications for Mohs surgery: anatomic location where tissue conservation is critical  Previously treated? No      Micrographic Surgery Details:  Post-operative length (cm): 1.1  Post-operative width (cm): 0.9  Number of Mohs stages: 1    Stage 1  Comments: The patient was brought into the operating room and placed in the procedure chair in the appropriate position.  The area positive by previous biopsy was identified and confirmed with the patient. The area of clinically obvious tumor was debulked using a curette and/or scalpel as needed. An incision was made following the Mohs approach through the skin. The specimen was taken to the lab, divided into 1 piece(s) and appropriately chromacoded and processed.  Tumor features identified on Mohs section: no tumor identified  Depth of defect: subcutaneous fat  Patient tolerance of procedure: tolerated well, no immediate complications    Reconstruction:  Was the defect reconstructed?: No   Repair: After a discussion with the patient regarding the options for wound closure, a decision was made to proceed with second intention healing.  Dressing/Follow-up: Surgifoam was placed in the wound. A pressure dressing was placed to help stabilize the wound and to minimize the risk of postoperative bleeding. Wound care was discussed, and the patient was given written post-operative wound care instructions.  Hemostasis achieved with: electrodesiccation  Outcome: patient tolerated procedure well with no complications    Post-procedure details: sterile dressing applied    Dressing type: Hypafix, pressure dressing, petrolatum and Telfa pad            Wound care was discussed, and the  patient was given written post-operative wound care instructions.      The patient will follow up with Madeleine Rivera MD as needed for any post operative problems or concerns, and will follow up with their primary dermatologist as scheduled.

## 2025-01-31 NOTE — PROGRESS NOTES
Office Visit Note  Date: 1/31/2025  Surgeon:  Madeleine Rivera MD  Office Location: [address]  [facility] [address]  Dept: [phone]  Dept Fax: [phone]  Referring Provider: Jhony Alexandre MD  [address] [place] [address]   [facility], [facility] [address]  [place] [id number]    Keisha Alvarze is a 69 y.o. male who presents for the following: MOHS Surgery (BCC- Left Anterior Distal Leg)    According to the patient, the lesion has been present for approximately greater than 1 year at the time of diagnosis.  The lesion is not causing symptoms.  The lesion has not been treated previously.    The patient does not have a pacemaker / defibrillator.  The patient does not have a heart valve / joint replacement.    The patient is not on blood thinners.  The patient does not have a history of hepatitis B or C.  The patient does not have a history of HIV.  The patient does not have a history of immunosuppression (e.g. organ transplantation, malignancy, medications)    Review of Systems:  No other skin or systemic complaints other than what is documented elsewhere in the note.    MEDICAL HISTORY: clinically relevant history including significant past medical history, medications and allergies was reviewed and documented in Epic.    Objective   Well appearing patient in no apparent distress; mood and affect are within normal limits.  Vital signs: See record.  Noted on the Left Anterior Distal Leg  Is a 0.8 x 0.6 cm scar    The patient confirmed the identified site.    Discussion:  The nature of the diagnosis was explained. The lesion is a skin cancer.  It has a risk of local growth and distant spread. The condition is associated with sun exposure.  Warning signs of non-melanoma skin cancer discussed. Patient was instructed to perform monthly self skin examination.  We recommended that the patient have regular full skin exams given an increased risk of subsequent skin  cancers. The patient was instructed to use sun protective behaviors including use of broad spectrum sunscreens and sun protective clothing to reduce risk of skin cancers.      Risks, benefits, side effects of Mohs surgery were discussed with patient and the patient voiced understanding.  It was explained that even though the cure rate of Mohs is very high it is not 100%. Risks of surgery including but not limited to bleeding, infection, numbness, nerve damage, and scar were reviewed.  Discussion included wound care requirements, activity restrictions, likely scar outcome and time to heal.     After Mohs surgery, the defect may need to be repaired surgically and the scar may be longer than the original lesion.  Reconstruction options, risks, and benefits were reviewed including second intention healing, linear repair (4-1 ratio was explained), local flaps, skin grafts, cartilage grafts and interpolation flaps (the need for multiple surgeries was explained). Possible outcomes were reviewed including likely scar appearance, failure of flap survival, infection, bleeding and the need for revision surgery.     The pathology was reviewed, the photograph was reviewed, and the referring physician's note was reviewed.    Patient elected for Mohs surgery.

## 2025-05-02 ENCOUNTER — APPOINTMENT (OUTPATIENT)
Dept: DERMATOLOGY | Facility: CLINIC | Age: 70
End: 2025-05-02
Payer: MEDICARE

## 2025-05-02 DIAGNOSIS — D04.5 SQUAMOUS CELL CARCINOMA IN SITU (SCCIS) OF SKIN OF ABDOMEN: ICD-10-CM

## 2025-05-02 PROCEDURE — 11603 EXC TR-EXT MAL+MARG 2.1-3 CM: CPT | Performed by: DERMATOLOGY

## 2025-05-02 PROCEDURE — 12032 INTMD RPR S/A/T/EXT 2.6-7.5: CPT | Performed by: DERMATOLOGY

## 2025-05-08 ENCOUNTER — TELEPHONE (OUTPATIENT)
Dept: DERMATOLOGY | Facility: CLINIC | Age: 70
End: 2025-05-08
Payer: MEDICARE

## 2025-05-08 NOTE — TELEPHONE ENCOUNTER
Surgical site: RIGHT MEDIAL MID ABDOMEN   Date of procedure: 5/2/2025    A voicemail was left for the patient about the pathology result which showed clear margins. No further surgery needed but the patient was advised to follow up with general dermatology. The patient was advised to call with any questions.

## 2025-05-09 ENCOUNTER — APPOINTMENT (OUTPATIENT)
Dept: DERMATOLOGY | Facility: CLINIC | Age: 70
End: 2025-05-09
Payer: MEDICARE

## 2025-05-12 ENCOUNTER — APPOINTMENT (OUTPATIENT)
Dept: DERMATOLOGY | Facility: CLINIC | Age: 70
End: 2025-05-12
Payer: MEDICARE

## 2025-06-09 ENCOUNTER — APPOINTMENT (OUTPATIENT)
Dept: DERMATOLOGY | Facility: CLINIC | Age: 70
End: 2025-06-09
Payer: MEDICARE
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
## 2025-07-25 ENCOUNTER — APPOINTMENT (OUTPATIENT)
Dept: DERMATOLOGY | Facility: CLINIC | Age: 70
End: 2025-07-25
Payer: MEDICARE

## 2025-07-25 DIAGNOSIS — D48.5 NEOPLASM OF UNCERTAIN BEHAVIOR OF SKIN: Primary | ICD-10-CM

## 2025-07-25 DIAGNOSIS — L73.9 FOLLICULITIS: ICD-10-CM

## 2025-07-25 DIAGNOSIS — D22.5 MELANOCYTIC NEVUS OF TRUNK: ICD-10-CM

## 2025-07-25 DIAGNOSIS — L21.9 SEBORRHEIC DERMATITIS: ICD-10-CM

## 2025-07-25 DIAGNOSIS — Z85.828 HISTORY OF NONMELANOMA SKIN CANCER: ICD-10-CM

## 2025-07-25 DIAGNOSIS — L82.1 SEBORRHEIC KERATOSIS: ICD-10-CM

## 2025-07-25 DIAGNOSIS — L57.0 ACTINIC KERATOSIS: ICD-10-CM

## 2025-07-25 DIAGNOSIS — L57.8 DIFFUSE PHOTODAMAGE OF SKIN: ICD-10-CM

## 2025-07-25 DIAGNOSIS — D18.01 HEMANGIOMA OF SKIN: ICD-10-CM

## 2025-07-25 ASSESSMENT — ITCH NUMERIC RATING SCALE: HOW SEVERE IS YOUR ITCHING?: 0

## 2025-07-25 ASSESSMENT — DERMATOLOGY QUALITY OF LIFE (QOL) ASSESSMENT
WHAT SINGLE SKIN CONDITION LISTED BELOW IS THE PATIENT ANSWERING THE QUALITY-OF-LIFE ASSESSMENT QUESTIONS ABOUT: NONE OF THE ABOVE
RATE HOW BOTHERED YOU ARE BY EFFECTS OF YOUR SKIN PROBLEMS ON YOUR ACTIVITIES (EG, GOING OUT, ACCOMPLISHING WHAT YOU WANT, WORK ACTIVITIES OR YOUR RELATIONSHIPS WITH OTHERS): 0 - NEVER BOTHERED
RATE HOW EMOTIONALLY BOTHERED YOU ARE BY YOUR SKIN PROBLEM (FOR EXAMPLE, WORRY, EMBARRASSMENT, FRUSTRATION): 0 - NEVER BOTHERED
RATE HOW BOTHERED YOU ARE BY SYMPTOMS OF YOUR SKIN PROBLEM (EG, ITCHING, STINGING BURNING, HURTING OR SKIN IRRITATION): 0 - NEVER BOTHERED
DATE THE QUALITY-OF-LIFE ASSESSMENT WAS COMPLETED: 67411
ARE THERE EXCLUSIONS OR EXCEPTIONS FOR THE QUALITY OF LIFE ASSESSMENT: NO
RATE HOW BOTHERED YOU ARE BY SYMPTOMS OF YOUR SKIN PROBLEM (EG, ITCHING, STINGING BURNING, HURTING OR SKIN IRRITATION): 0 - NEVER BOTHERED

## 2025-07-25 ASSESSMENT — PATIENT GLOBAL ASSESSMENT (PGA): PATIENT GLOBAL ASSESSMENT: PATIENT GLOBAL ASSESSMENT:  1 - CLEAR

## 2025-07-25 NOTE — Clinical Note
On the patient's mid back and right medial mid back, there are pink, well-healed scars at his recent biopsy sites each with a surrounding rim of erythema, grittiness, and scale; scattered on the patient's back, there are multiple erythematous, gritty, scaly macules

## 2025-07-25 NOTE — Clinical Note
History of multiple nonmelanoma skin cancers and actinic keratoses and photodamage.  There is no evidence of recurrence at the sites of the patient's previously treated NMSCs on exam today.  The signs and symptoms of skin cancer were reviewed and the patient was advised to practice sun protection and sun avoidance, use daily sunscreen, and perform regular self skin exams.  I will have the patient return to our office in 3 to 4 months, pending the above biopsy results, for routine follow-up and skin exam, and the patient was instructed to call our office should the patient notice any new, changing, symptomatic, or otherwise concerning skin lesions before then.  The patient expressed understanding and is in agreement with this plan.

## 2025-07-25 NOTE — Clinical Note
Scattered on the patient's chest and neck, there are several follicular-based erythematous, inflammatory papules and pustules

## 2025-07-25 NOTE — Clinical Note
Ann Angiomas - the benign nature of these vascular lesions was discussed with the patient today and reassurance provided.  No treatment is medically indicated for these lesions at this time.

## 2025-07-25 NOTE — Clinical Note
Folliculitis -flare on chest and neck.  The bacterial nature of this condition and treatment options were discussed with the patient today.  At this time, I recommend topical antibiotic therapy with Clindamycin 1% lotion, which the patient was instructed to apply twice daily to the affected areas or up to 3-4 times per day as needed for active lesions.  The risks, benefits, and side effects of this medication were discussed.  The patient expressed understanding and is in agreement with this plan.

## 2025-07-25 NOTE — Clinical Note
Biopsy-proven Actinic Keratoses and additional AKs -mid back and right medial mid back, both present on the deep and peripheral margins, and scattered on back, respectively.  The pre-cancerous nature of these lesions and treatment options were discussed with the patient today.  At this time, I recommend treatment with liquid nitrogen cryotherapy.  The patient expressed understanding, is in agreement with this plan, and wishes to proceed with cryotherapy today.

## 2025-07-25 NOTE — Clinical Note
On the patient's right medial mid abdomen, left anterior distal leg, left lateral mid abdomen, right anterior proximal arm, left lateral antecubital fossa, left medial lower back, right lateral mid lower back, mid upper abdomen, and scattered on his face, neck, trunk, and extremities, there are well-healed scars with no evidence of recurrent growth on exam today.

## 2025-07-26 NOTE — PROGRESS NOTES
Subjective     Yunior Alvarez is a 70 y.o. male who presents for the following: Skin Check.  He notes recent pimple breakouts on his chest and neck.  He denies any new, changing, or concerning skin lesions since his last visit; no bleeding, itching, or burning lesions.      Review of Systems:  No other skin or systemic complaints other than what is documented elsewhere in the note.    The following portions of the chart were reviewed this encounter and updated as appropriate:       Skin Cancer History  Biopsy Log Book  Biopsied Type Location Status   11/2/23 BCC Right Anterior Proximal Arm Refer Mohs/Surgeon  8/30/24 5/2/24 BCC Left Lateral Mid-Abdomen Treatment Complete  8/30/24 11/7/24 SCC in Situ Right Medial Mid-Abdomen Refer Mohs/Surgeon  5/2/25 11/7/24 BCC, Superficial Left Anterior Distal Leg Refer Mohs/Surgeon  1/31/25       Additional History      Specialty Problems          Dermatology Problems    Basal cell carcinoma of skin of other part of trunk    Basal cell carcinoma of skin of right lower limb, including hip    Basal cell carcinoma of skin of right upper limb, including shoulder    Basal cell carcinoma of skin of unspecified parts of face    Carcinoma in situ of skin of trunk    Hemangioma of skin and subcutaneous tissue    Inflamed seborrheic keratosis    Multiple actinic keratoses    Other seborrheic keratosis    Personal history of other malignant neoplasm of skin    Seborrheic dermatitis, unspecified    Skin changes due to chronic exposure to nonionizing radiation, unspecified    Squamous cell carcinoma of skin of left upper limb, including shoulder    Squamous cell carcinoma of skin of left lower limb, including hip    Squamous cell carcinoma of skin of other part of trunk    Squamous cell carcinoma of skin of right upper limb, including shoulder    Basal cell carcinoma    Skin cancer       Past Dermatologic / Past Relevant Medical History:    - history of several nonmelanoma skin  cancers, including:    - most recently SCC in situ on right medial mid abdomen and BCC on left anterior distal leg both diagnosed on 11/7/24 s/p wide local excision with 5-mm margins by Dr. Rivera on 5/2/25 and Mohs surgery by Dr. Rivera on 1/31/25, respectively  - BCC on left lateral mid abdomen diagnosed on 5/2/24 s/p wide local excision with 5-mm margins by Dr. Rivera on 8/30/24  - BCC on right anterior proximal arm diagnosed on 11/2/23 s/p wide local excision with 5-mm margins by Dr. Rivera on 8/2/24  - SCC in situ on left lateral antecubital fossa and 2 BCCs on left medial lower back and right lateral mid lower back all 3 diagnosed on 4/6/23 and s/p Mohs surgery by Dr. Rivera on 5/8/23 and then 2 wide local excisions each with 5-mm margins by Dr. Rivera both on 5/15/23, respectively  - BCC on mid upper abdomen diagnosed on 11/3/22 s/p wide local excision with 5-mm margins by Dr. Rivera on 1/27/23  - SCC on right posterior proximal arm, BCC on right upper chest, and SCC in situ on right lateral mid back lateral all 3 diagnosed on 5/5/22 and s/p Mohs surgery by Dr. Betancourt on 8/16/22, wide local excision with 5-mm margins by Dr. Betancourt on 8/23/22, and ED&C by Dr. Corea on 11/15/22, respectively  - BCC on left lateral upper forehead diagnosed on 6/28/21 s/p Mohs surgery by Dr. Betancourt on 9/2/21  - BCC on right upper back diagnosed on 1/8/21 s/p ED&C on 3/8/21  - previously BCCs on right medial proximal leg, right anterior proximal leg, right anterior mid leg, and right medial lower back all 4 diagnosed on 9/11/20 s/p ED&C on 11/12/20  - history of SCC in situ on left anterior distal leg diagnosed on 5/15/20 s/p ED&C on 6/12/20 - history of BCC on left medial mid-abdomen diagnosed on 2/14/20 s/p ED&C on 5/15/20 - SCC in situ on left upper chest and BCCs on right anterior shoulder and right shoulder proximal all 3 diagnosed on 7/18/19 and s/p ED&C at last visit on 8/22/20  - history of SCC on left anterior shoulder diagnosed  at initial visit on 10/5/18 s/p wide local excision with 5-mm margins by Dr. Choi on 10/22/18  - history of 2 BCCs on right dorsal hand and right shoulder diagnosed at initial visit on 10/5/18 and s/p Mohs surgery by Dr. Betancourt on 12/13/18 and ED&C on 11/1/18, respectively    - AKs s/p course of topical field therapy with Efudex 5% cream completed on:  - face and scalp in January 2019  - chest in November 2019  - left dorsal hand forearm and arm in March 2020  - forehead and scalp in January 2023  - upper chest and right antecubital fossa in March 2024  - face and scalp in December 2024  - left antecubital fossa in February 2025    - no history of melanoma    Family History:    Mother - nonmelanoma skin cancers of unknown type  No family history of melanoma    Social History:    The patient is retired from working for [employer] and lives in [place] with his wife, and they have 4 children, all of whom live in [place], including 2 in [place]; he enjoys motorcycling and used to use tanning booths regularly; his 2 grandsons play hockey, including one for his high school in [place]; he recently went on a motorcycle rally in [place] and will be going on an [place] cruise next week    Allergies:  Patient has no known allergies.    Current Medications / CAM's:  Current Medications[1]     Objective   Well appearing patient in no apparent distress; mood and affect are within normal limits.    A full examination was performed including scalp, face, eyes, ears, nose, lips, neck, chest, axillae, abdomen, back, bilateral upper extremities, and bilateral lower extremities. All findings within normal limits unless otherwise noted below.    Assessment/Plan   Skin Exam  1. NEOPLASM OF UNCERTAIN BEHAVIOR OF SKIN (4)  Right Anterior Distal Leg  8 mm erythematous, scaly papule     - Shave removal    Lesion length (cm):  0.8  Margin per side (cm):  0  Lesion diameter (cm):  0.8  Informed consent: discussed and consent obtained     Timeout: patient name, date of birth, surgical site, and procedure verified    Procedure prep:  Patient was prepped and draped  Anesthesia: the lesion was anesthetized in a standard fashion    Anesthetic:  1% lidocaine w/ epinephrine 1-100,000 local infiltration  Instrument used: flexible razor blade    Hemostasis achieved with: aluminum chloride    Outcome: patient tolerated procedure well    Post-procedure details: sterile dressing applied and wound care instructions given    Dressing type: bandage and petrolatum      - Staff Communication: Dermatology Local Anesthesia: 1 % Lidocaine / Epinephrine - Amount:0.5ml  Specimen 1 - Dermatopathology- DERM LAB  Differential Diagnosis: AK v SCCIS  Check Margins Yes/No?:    Comments:    Dermpath Lab: Routine Histopathology (formalin-fixed tissue)  Left Anterior Medial Mid-Leg  6 mm pink, shiny papule     - Shave removal    Lesion length (cm):  0.7  Margin per side (cm):  0  Lesion diameter (cm):  0.7  Informed consent: discussed and consent obtained    Timeout: patient name, date of birth, surgical site, and procedure verified    Procedure prep:  Patient was prepped and draped  Anesthesia: the lesion was anesthetized in a standard fashion    Anesthetic:  1% lidocaine w/ epinephrine 1-100,000 local infiltration  Instrument used: flexible razor blade    Hemostasis achieved with: aluminum chloride    Outcome: patient tolerated procedure well    Post-procedure details: sterile dressing applied and wound care instructions given    Dressing type: bandage and petrolatum      - Staff Communication: Dermatology Local Anesthesia: 1 % Lidocaine / Epinephrine - Amount:0.5ml  Specimen 2 - Dermatopathology- DERM LAB  Differential Diagnosis: BCC v BLK v SCCIS  Check Margins Yes/No?:    Comments:    Dermpath Lab: Routine Histopathology (formalin-fixed tissue)  Right Posterior Proximal Leg  7 mm pink, shiny papule     - Shave removal    Lesion length (cm):  0.7  Margin per side (cm):   0  Lesion diameter (cm):  0.7  Informed consent: discussed and consent obtained    Timeout: patient name, date of birth, surgical site, and procedure verified    Procedure prep:  Patient was prepped and draped  Anesthesia: the lesion was anesthetized in a standard fashion    Anesthetic:  1% lidocaine w/ epinephrine 1-100,000 local infiltration  Instrument used: flexible razor blade    Hemostasis achieved with: aluminum chloride    Outcome: patient tolerated procedure well    Post-procedure details: sterile dressing applied and wound care instructions given    Dressing type: bandage and petrolatum      - Staff Communication: Dermatology Local Anesthesia: 1 % Lidocaine / Epinephrine - Amount:0.5ml  Specimen 3 - Dermatopathology- DERM LAB  Differential Diagnosis: BCC v AK v BLK v SCCIS  Check Margins Yes/No?:    Comments:    Dermpath Lab: Routine Histopathology (formalin-fixed tissue)  Right Posterior Lateral Distal Thigh  7 mm pink, shiny papule   - Shave removal    Lesion length (cm):  0.6  Margin per side (cm):  0  Lesion diameter (cm):  0.6  Informed consent: discussed and consent obtained    Timeout: patient name, date of birth, surgical site, and procedure verified    Procedure prep:  Patient was prepped and draped  Anesthesia: the lesion was anesthetized in a standard fashion    Anesthetic:  1% lidocaine w/ epinephrine 1-100,000 local infiltration  Instrument used: flexible razor blade    Hemostasis achieved with: aluminum chloride    Outcome: patient tolerated procedure well    Post-procedure details: sterile dressing applied and wound care instructions given    Dressing type: bandage and petrolatum      - Staff Communication: Dermatology Local Anesthesia: 1 % Lidocaine / Epinephrine - Amount:0.5ml  Specimen 4 - Dermatopathology- DERM LAB  Differential Diagnosis: BCC v AK v BLK v SCCIS  Check Margins Yes/No?:    Comments:    Dermpath Lab: Routine Histopathology (formalin-fixed tissue)  2. ACTINIC KERATOSIS  (23)  Right Upper Back (23)  On the patient's mid back and right medial mid back, there are pink, well-healed scars at his recent biopsy sites each with a surrounding rim of erythema, grittiness, and scale; scattered on the patient's back, there are multiple erythematous, gritty, scaly macules  Biopsy-proven Actinic Keratoses and additional AKs -mid back and right medial mid back, both present on the deep and peripheral margins, and scattered on back, respectively.  The pre-cancerous nature of these lesions and treatment options were discussed with the patient today.  At this time, I recommend treatment with liquid nitrogen cryotherapy.  The patient expressed understanding, is in agreement with this plan, and wishes to proceed with cryotherapy today.  - Destr of lesion - Right Upper Back (23)  Complexity: simple    Destruction method: cryotherapy    Informed consent: discussed and consent obtained    Lesion destroyed using liquid nitrogen: Yes    Cryotherapy cycles:  1  Outcome: patient tolerated procedure well with no complications    Post-procedure details: wound care instructions given      Existing Treatments  - fluorouracil (Efudex) 5 % cream cream - Apply to the affected ju of the face twice daily for a total of 3 weeks. Wash hands thoroughly after each application.  3. MELANOCYTIC NEVUS OF TRUNK  Generalized  Scattered on the patient's face, neck, trunk, and extremities, there are several small, round- to oval-shaped, brown-pigmented and pink-colored, symmetric, uniform-appearing macules and dome-shaped papules  Clinically benign- to slightly atypical-appearing nevi - the clinically benign- to slightly atypical-appearing nature of the patient's nevi was discussed with the patient today.  None of the patient's nevi meet threshold for biopsy today.  I emphasized the importance of performing monthly self-skin exams using the ABCDs of monitoring moles, which were reviewed with the patient today and an  informational hand-out provided.  I also emphasized the importance of sun avoidance and sun protection with daily sunscreen use.  The patient expressed understanding and is in agreement with this plan.  4. SEBORRHEIC KERATOSIS  Generalized  Scattered on the patient's face, neck, trunk, and extremities, there are multiple tan- to light brown-colored, hyperkeratotic, stuck-on appearing papules of varying size and shape  Seborrheic Keratoses - the benign nature of these lesions was discussed with the patient today and reassurance provided.  No treatment is medically indicated for these lesions at this time.  5. HEMANGIOMA OF SKIN  Generalized  Scattered on the patient's face, neck, trunk, and extremities, there are multiple small, round, cherry red- to purplish-colored, symmetric, uniform, vascular-appearing macules and papules  Cherry Angiomas - the benign nature of these vascular lesions was discussed with the patient today and reassurance provided.  No treatment is medically indicated for these lesions at this time.  6. FOLLICULITIS  Neck - Anterior  Scattered on the patient's chest and neck, there are several follicular-based erythematous, inflammatory papules and pustules  Folliculitis -flare on chest and neck.  The bacterial nature of this condition and treatment options were discussed with the patient today.  At this time, I recommend topical antibiotic therapy with Clindamycin 1% lotion, which the patient was instructed to apply twice daily to the affected areas or up to 3-4 times per day as needed for active lesions.  The risks, benefits, and side effects of this medication were discussed.  The patient expressed understanding and is in agreement with this plan.  7. SEBORRHEIC DERMATITIS  Generalized  On the patient's face, mainly the glabella and bilateral eyebrows and perinasal creases, there are pink, scaly patches with whitish-yellowish, greasy scale  Seborrheic Dermatitis - flare on face.  The potentially  chronic and intermittently flaring nature of this condition and treatment options were discussed extensively with the patient today.  At this time, I recommend topical anti-fungal therapy with Ketoconazole 2% cream, which the patient was instructed to apply twice daily to the affected areas of the face.  The risks, benefits, and side effects of this medication were discussed.  The patient expressed understanding and is in agreement with this plan.  Existing Treatments  - ketoconazole (NIZOral) 2 % shampoo - Apply topically 3 times a week.  - ketoconazole (NIZOral) 2 % cream - Apply twice daily to affected areas of face  8. HISTORY OF NONMELANOMA SKIN CANCER  Generalized  On the patient's right medial mid abdomen, left anterior distal leg, left lateral mid abdomen, right anterior proximal arm, left lateral antecubital fossa, left medial lower back, right lateral mid lower back, mid upper abdomen, and scattered on his face, neck, trunk, and extremities, there are well-healed scars with no evidence of recurrent growth on exam today.  History of multiple nonmelanoma skin cancers and actinic keratoses and photodamage.  There is no evidence of recurrence at the sites of the patient's previously treated NMSCs on exam today.  The signs and symptoms of skin cancer were reviewed and the patient was advised to practice sun protection and sun avoidance, use daily sunscreen, and perform regular self skin exams.  I will have the patient return to our office in 3 to 4 months, pending the above biopsy results, for routine follow-up and skin exam, and the patient was instructed to call our office should the patient notice any new, changing, symptomatic, or otherwise concerning skin lesions before then.  The patient expressed understanding and is in agreement with this plan.  9. DIFFUSE PHOTODAMAGE OF SKIN  Photodistributed  Diffuse photodamage with actinic changes with telangiectasia and mottled pigmentation in sun-exposed  areas.  Photodamage.  The signs and symptoms of skin cancer were reviewed and the patient was advised to practice sun protection and sun avoidance, use daily sunscreen, and perform regular self skin exams.  Sun protection was discussed, including avoiding the mid-day sun, wearing a sunscreen with SPF at least 50, and stressing the need for reapplication of sunscreen and applying more than they think they need.          [1]   Current Outpatient Medications:     fluorouracil (Efudex) 5 % cream cream, Apply to the affected ju of the face twice daily for a total of 3 weeks. Wash hands thoroughly after each application., Disp: 40 g, Rfl: 2     mg tablet, Take 1 tablet (800 mg) by mouth every 6 hours if needed., Disp: , Rfl:     ketoconazole (NIZOral) 2 % cream, Apply twice daily to affected areas of face, Disp: 60 g, Rfl: 11    ketoconazole (NIZOral) 2 % shampoo, Apply topically 3 times a week., Disp: 120 mL, Rfl: 11    triamcinolone (Kenalog) 0.025 % cream, Apply twice daily to affected areas (avoid the eyes) for 1-2 weeks following course of Efudex as directed, Disp: 80 g, Rfl: 0    triamcinolone (Kenalog) 0.025 % cream, Apply twice daily to affected areas (avoid the eyes) for 1-2 weeks following course of Efudex as directed, Disp: 80 g, Rfl: 0

## 2025-09-12 ENCOUNTER — APPOINTMENT (OUTPATIENT)
Dept: DERMATOLOGY | Facility: CLINIC | Age: 70
End: 2025-09-12
Payer: MEDICARE

## 2025-09-19 ENCOUNTER — APPOINTMENT (OUTPATIENT)
Dept: DERMATOLOGY | Facility: CLINIC | Age: 70
End: 2025-09-19
Payer: MEDICARE

## 2025-10-31 ENCOUNTER — APPOINTMENT (OUTPATIENT)
Dept: PRIMARY CARE | Facility: CLINIC | Age: 70
End: 2025-10-31
Payer: MEDICARE

## 2026-01-29 ENCOUNTER — APPOINTMENT (OUTPATIENT)
Dept: DERMATOLOGY | Facility: CLINIC | Age: 71
End: 2026-01-29
Payer: MEDICARE